# Patient Record
Sex: MALE | Race: BLACK OR AFRICAN AMERICAN | Employment: OTHER | URBAN - METROPOLITAN AREA
[De-identification: names, ages, dates, MRNs, and addresses within clinical notes are randomized per-mention and may not be internally consistent; named-entity substitution may affect disease eponyms.]

---

## 2019-07-11 ENCOUNTER — HOSPITAL ENCOUNTER (OUTPATIENT)
Dept: GENERAL RADIOLOGY | Age: 69
Discharge: HOME | End: 2019-07-11
Attending: FAMILY MEDICINE
Payer: MEDICARE

## 2019-07-11 DIAGNOSIS — M25.472 LEFT ANKLE SWELLING: ICD-10-CM

## 2019-07-11 PROCEDURE — 73610 X-RAY EXAM OF ANKLE: CPT

## 2019-07-11 NOTE — PROGRESS NOTES
Arthritis noted in ankle. Unusual for it to suddenly swell without injury.  I will order the ultrasound to make sure there is no clot in the leg

## 2019-07-17 ENCOUNTER — HOSPITAL ENCOUNTER (OUTPATIENT)
Dept: ULTRASOUND IMAGING | Age: 69
Discharge: HOME | End: 2019-07-17
Attending: FAMILY MEDICINE

## 2019-07-17 DIAGNOSIS — M25.472 LEFT ANKLE SWELLING: ICD-10-CM

## 2019-11-05 ENCOUNTER — HOSPITAL ENCOUNTER (EMERGENCY)
Age: 69
Discharge: HOME | End: 2019-11-05
Attending: EMERGENCY MEDICINE
Payer: MEDICARE

## 2019-11-05 VITALS
WEIGHT: 190 LBS | HEIGHT: 69 IN | SYSTOLIC BLOOD PRESSURE: 126 MMHG | OXYGEN SATURATION: 96 % | BODY MASS INDEX: 28.14 KG/M2 | HEART RATE: 57 BPM | RESPIRATION RATE: 18 BRPM | DIASTOLIC BLOOD PRESSURE: 72 MMHG | TEMPERATURE: 98.2 F

## 2019-11-05 DIAGNOSIS — T78.3XXA ANGIOEDEMA, INITIAL ENCOUNTER: Primary | ICD-10-CM

## 2019-11-05 LAB
ANION GAP SERPL CALC-SCNC: 8 MMOL/L (ref 7–16)
BASOPHILS # BLD: 0.1 K/UL (ref 0–0.2)
BASOPHILS NFR BLD: 1 % (ref 0–2)
BUN SERPL-MCNC: 17 MG/DL (ref 8–23)
CALCIUM SERPL-MCNC: 9.1 MG/DL (ref 8.3–10.4)
CHLORIDE SERPL-SCNC: 103 MMOL/L (ref 98–107)
CO2 SERPL-SCNC: 28 MMOL/L (ref 21–32)
CREAT SERPL-MCNC: 1.23 MG/DL (ref 0.8–1.5)
DIFFERENTIAL METHOD BLD: ABNORMAL
EOSINOPHIL # BLD: 0.3 K/UL (ref 0–0.8)
EOSINOPHIL NFR BLD: 4 % (ref 0.5–7.8)
ERYTHROCYTE [DISTWIDTH] IN BLOOD BY AUTOMATED COUNT: 12.9 % (ref 11.9–14.6)
GLUCOSE SERPL-MCNC: 87 MG/DL (ref 65–100)
HCT VFR BLD AUTO: 42.3 % (ref 41.1–50.3)
HGB BLD-MCNC: 13.4 G/DL (ref 13.6–17.2)
IMM GRANULOCYTES # BLD AUTO: 0 K/UL (ref 0–0.5)
IMM GRANULOCYTES NFR BLD AUTO: 0 % (ref 0–5)
LYMPHOCYTES # BLD: 2.7 K/UL (ref 0.5–4.6)
LYMPHOCYTES NFR BLD: 28 % (ref 13–44)
MCH RBC QN AUTO: 27.4 PG (ref 26.1–32.9)
MCHC RBC AUTO-ENTMCNC: 31.7 G/DL (ref 31.4–35)
MCV RBC AUTO: 86.5 FL (ref 79.6–97.8)
MONOCYTES # BLD: 0.7 K/UL (ref 0.1–1.3)
MONOCYTES NFR BLD: 8 % (ref 4–12)
NEUTS SEG # BLD: 5.8 K/UL (ref 1.7–8.2)
NEUTS SEG NFR BLD: 60 % (ref 43–78)
NRBC # BLD: 0 K/UL (ref 0–0.2)
PLATELET # BLD AUTO: 288 K/UL (ref 150–450)
PMV BLD AUTO: 9.4 FL (ref 9.4–12.3)
POTASSIUM SERPL-SCNC: 3.6 MMOL/L (ref 3.5–5.1)
RBC # BLD AUTO: 4.89 M/UL (ref 4.23–5.6)
SODIUM SERPL-SCNC: 139 MMOL/L (ref 136–145)
WBC # BLD AUTO: 9.7 K/UL (ref 4.3–11.1)

## 2019-11-05 PROCEDURE — 96375 TX/PRO/DX INJ NEW DRUG ADDON: CPT | Performed by: EMERGENCY MEDICINE

## 2019-11-05 PROCEDURE — 74011250636 HC RX REV CODE- 250/636: Performed by: EMERGENCY MEDICINE

## 2019-11-05 PROCEDURE — 96374 THER/PROPH/DIAG INJ IV PUSH: CPT | Performed by: EMERGENCY MEDICINE

## 2019-11-05 PROCEDURE — 85025 COMPLETE CBC W/AUTO DIFF WBC: CPT

## 2019-11-05 PROCEDURE — 74011636637 HC RX REV CODE- 636/637: Performed by: EMERGENCY MEDICINE

## 2019-11-05 PROCEDURE — 99284 EMERGENCY DEPT VISIT MOD MDM: CPT | Performed by: EMERGENCY MEDICINE

## 2019-11-05 PROCEDURE — 80048 BASIC METABOLIC PNL TOTAL CA: CPT

## 2019-11-05 PROCEDURE — 74011000250 HC RX REV CODE- 250: Performed by: EMERGENCY MEDICINE

## 2019-11-05 RX ORDER — DEXAMETHASONE SODIUM PHOSPHATE 100 MG/10ML
10 INJECTION INTRAMUSCULAR; INTRAVENOUS
Status: COMPLETED | OUTPATIENT
Start: 2019-11-05 | End: 2019-11-05

## 2019-11-05 RX ORDER — DIPHENHYDRAMINE HYDROCHLORIDE 50 MG/ML
25 INJECTION, SOLUTION INTRAMUSCULAR; INTRAVENOUS
Status: COMPLETED | OUTPATIENT
Start: 2019-11-05 | End: 2019-11-05

## 2019-11-05 RX ORDER — PREDNISONE 10 MG/1
40 TABLET ORAL
Status: COMPLETED | OUTPATIENT
Start: 2019-11-05 | End: 2019-11-05

## 2019-11-05 RX ORDER — PREDNISONE 20 MG/1
40 TABLET ORAL DAILY
Qty: 10 TAB | Refills: 0 | Status: SHIPPED | OUTPATIENT
Start: 2019-11-05 | End: 2019-11-10

## 2019-11-05 RX ORDER — FAMOTIDINE 20 MG/1
20 TABLET, FILM COATED ORAL 2 TIMES DAILY
Qty: 20 TAB | Refills: 0 | Status: SHIPPED | OUTPATIENT
Start: 2019-11-05 | End: 2019-11-15

## 2019-11-05 RX ADMIN — DEXAMETHASONE SODIUM PHOSPHATE 10 MG: 10 INJECTION INTRAMUSCULAR; INTRAVENOUS at 18:00

## 2019-11-05 RX ADMIN — PREDNISONE 40 MG: 10 TABLET ORAL at 19:51

## 2019-11-05 RX ADMIN — FAMOTIDINE 20 MG: 10 INJECTION, SOLUTION INTRAVENOUS at 18:00

## 2019-11-05 RX ADMIN — DIPHENHYDRAMINE HYDROCHLORIDE 25 MG: 50 INJECTION, SOLUTION INTRAMUSCULAR; INTRAVENOUS at 18:00

## 2019-11-05 NOTE — ED PROVIDER NOTES
26-year-old male has history of hypertension. Noted over the last 3 hours with swelling in his right tongue. No hoarseness or drooling. Has not tried any liquids. No chest pain or shortness of breath. No fever chills. No itching or rash. No history of similar. He is on an ARB. The history is provided by the patient. Tongue Swelling    The incident occurred just prior to arrival. Pertinent negatives include no abdominal pain, no vomiting and no cough. There were no sick contacts. Past Medical History:   Diagnosis Date    Cancer [facility])     prostate    Hypertension     Pancreatitis 2010    possible ETOH induced       Past Surgical History:   Procedure Laterality Date    HX COLONOSCOPY  2016    normal    HX CRYOABLATION OF THE PROSTATE      Still has a prostate    HX ORTHOPAEDIC      estiven knee replacements, cervical fusion C 6and 7         Family History:   Problem Relation Age of Onset    Hypertension Mother     No Known Problems Father         did not know him       Social History     Socioeconomic History    Marital status:      Spouse name: Kenyetta Patrick Number of children: Not on file    Years of education: Not on file    Highest education level: Not on file   Occupational History    Occupation: Retired     Comment: [employer]   Social Needs    Financial resource strain: Not on file    Food insecurity:     Worry: Not on file     Inability: Not on file   [facility] needs:     Medical: Not on file     Non-medical: Not on file   Tobacco Use    Smoking status: Never Smoker    Smokeless tobacco: Never Used   Substance and Sexual Activity    Alcohol use:  Yes     Alcohol/week: 1.0 standard drinks     Types: 1 Cans of beer per week    Drug use: Not on file    Sexual activity: Not on file   Lifestyle    Physical activity:     Days per week: Not on file     Minutes per session: Not on file    Stress: Not on file   Relationships    Social connections:     Talks on phone: Not on file     Gets together: Not on file     Attends [employer] service: Not on file     Active member of club or organization: Not on file     Attends meetings of clubs or organizations: Not on file     Relationship status: Not on file    Intimate partner violence:     Fear of current or ex partner: Not on file     Emotionally abused: Not on file     Physically abused: Not on file     Forced sexual activity: Not on file   Other Topics Concern    Not on file   Social History Narrative    Not on file         ALLERGIES: Morphine    Review of Systems   Constitutional: Negative for chills and fever. HENT: Negative for drooling and voice change. Respiratory: Negative for cough, shortness of breath and stridor. Gastrointestinal: Negative for abdominal pain and vomiting. Vitals:    11/05/19 1722   BP: 155/62   Pulse: 62   Resp: 16   Temp: 98.2 °F (36.8 °C)   SpO2: 98%   Weight: 86.2 kg (190 lb)   Height: 5' 9\" (1.753 m)            Physical Exam   Constitutional: He appears well-developed and well-nourished. No distress. HENT:   Mouth/Throat: No oropharyngeal exudate. Eyes: Pupils are equal, round, and reactive to light. Conjunctivae are normal.   Neck: Normal range of motion. Cardiovascular: Normal rate and regular rhythm. Pulmonary/Chest: Effort normal and breath sounds normal.   Nursing note and vitals reviewed. MDM  Number of Diagnoses or Management Options  Diagnosis management comments: Angioedema, possibly allergic versus due to ARB. IV steroids Benadryl and observation. Amount and/or Complexity of Data Reviewed  Clinical lab tests: ordered and reviewed    Risk of Complications, Morbidity, and/or Mortality  Presenting problems: moderate  Diagnostic procedures: minimal  Management options: low           Procedures    6:27 PM  Labs normal.  Patient states slight improvement. Feels he can talk better. Still no shortness of breath.     Results Include:    Recent Results (from the past 24 hour(s))   CBC WITH AUTOMATED DIFF    Collection Time: 11/05/19  5:57 PM   Result Value Ref Range    WBC 9.7 4.3 - 11.1 K/uL    RBC 4.89 4.23 - 5.6 M/uL    HGB 13.4 (L) 13.6 - 17.2 g/dL    HCT 42.3 41.1 - 50.3 %    MCV 86.5 79.6 - 97.8 FL    MCH 27.4 26.1 - 32.9 PG    MCHC 31.7 31.4 - 35.0 g/dL    RDW 12.9 11.9 - 14.6 %    PLATELET 963 278 - 457 K/uL    MPV 9.4 9.4 - 12.3 FL    ABSOLUTE NRBC 0.00 0.0 - 0.2 K/uL    DF AUTOMATED      NEUTROPHILS 60 43 - 78 %    LYMPHOCYTES 28 13 - 44 %    MONOCYTES 8 4.0 - 12.0 %    EOSINOPHILS 4 0.5 - 7.8 %    BASOPHILS 1 0.0 - 2.0 %    IMMATURE GRANULOCYTES 0 0.0 - 5.0 %    ABS. NEUTROPHILS 5.8 1.7 - 8.2 K/UL    ABS. LYMPHOCYTES 2.7 0.5 - 4.6 K/UL    ABS. MONOCYTES 0.7 0.1 - 1.3 K/UL    ABS. EOSINOPHILS 0.3 0.0 - 0.8 K/UL    ABS. BASOPHILS 0.1 0.0 - 0.2 K/UL    ABS. IMM. GRANS. 0.0 0.0 - 0.5 K/UL   METABOLIC PANEL, BASIC    Collection Time: 11/05/19  5:57 PM   Result Value Ref Range    Sodium 139 136 - 145 mmol/L    Potassium 3.6 3.5 - 5.1 mmol/L    Chloride 103 98 - 107 mmol/L    CO2 28 21 - 32 mmol/L    Anion gap 8 7 - 16 mmol/L    Glucose 87 65 - 100 mg/dL    BUN 17 8 - 23 MG/DL    Creatinine 1.23 0.8 - 1.5 MG/DL    GFR est AA >60 >60 ml/min/1.73m2    GFR est non-AA >60 >60 ml/min/1.73m2    Calcium 9.1 8.3 - 10.4 MG/DL       .7:31 PM  Swelling going down even further. Stable for discharge home.

## 2019-11-05 NOTE — DISCHARGE INSTRUCTIONS
Benadryl, 1-2 every 6 hours for next 2 days. Do not take the blood pressure medication indicated. Take prednisone every morning for the next 5 days. Call your doctor tomorrow to recheck and also to discuss whether or not to continue the blood pressure medication. Recheck sooner for worse breathing, high fever shortness of breath. Patient Education        Angioedema: Care Instructions  Your Care Instructions    Angioedema is an allergic reaction. It causes swelling and welts in the deep layers of the skin. Angioedema can sometimes occur along with hives. Hives are an allergic reaction in the outer layers of the skin. Angioedema can range from mild to severe. Painful welts can develop on the face. Angioedema can also occur on other parts of the body. In severe cases, the inside of the throat can swell and make it hard to breathe. Many things can cause this condition, including foods, insect bites, and medicines (such as aspirin and some blood pressure medicines). It also can run in families. Sometimes you may know what caused the reaction, but other times you may not know. Follow-up care is a key part of your treatment and safety. Be sure to make and go to all appointments, and call your doctor if you are having problems. It's also a good idea to know your test results and keep a list of the medicines you take. How can you care for yourself at home? · Take your medicines exactly as prescribed. Call your doctor if you think you are having a problem with your medicine. You will get more details on the specific medicines your doctor prescribes. Some medicines used to treat angioedema can make you too sleepy to drive safely. Do not drive if you take medicine that may make you sleepy. · Avoid foods or medicine that may have triggered the swelling. · For comfort:  ? Try taking a cool bath. Or place a cool, wet towel on the swollen area. ? Avoid hot baths and showers. ? Wear loose clothing.   · Your doctor may prescribe a shot of epinephrine to carry with you in case you have a severe reaction. Learn how to give yourself the shot and keep it with you at all times. Make sure it has not . When should you call for help? Give an epinephrine shot if:    · You think you are having a severe allergic reaction.    After giving an epinephrine shot call 911, even if you feel better.   Call 911 if:    · You have symptoms of a severe allergic reaction. These may include:  ? Sudden raised, red areas (hives) all over your body. ? Swelling of the throat, mouth, lips, or tongue. ? Trouble breathing. ? Passing out (losing consciousness). Or you may feel very lightheaded or suddenly feel weak, confused, or restless.     · You have been given an epinephrine shot, even if you feel better.    Call your doctor now or seek immediate medical care if:    · You have symptoms of an allergic reaction, such as:  ? A rash or hives (raised, red areas on the skin). ? Itching. ? Swelling. ? Belly pain, nausea, or vomiting.    Watch closely for changes in your health, and be sure to contact your doctor if:    · You do not get better as expected. Where can you learn more? Go to http://claire-jasmin.info/. Enter W165 in the search box to learn more about \"Angioedema: Care Instructions. \"  Current as of: 2019  Content Version: 12.2  © 5619-9568 [employer]. Care instructions adapted under license by [employer] (which disclaims liability or warranty for this information). If you have questions about a medical condition or this instruction, always ask your healthcare professional. [address] any warranty or liability for your use of this information.

## 2019-11-05 NOTE — ED TRIAGE NOTES
Pt states swelling to right side of tongue for about one hour. States it \"feels funny to swallow\". Denies SOB or sore throat. States he has not taken any new medications and has not eaten any new foods.

## 2019-11-06 NOTE — ED NOTES
I have reviewed discharge instructions with the patient. The patient verbalized understanding. Patient left ED via Discharge Method: ambulatory to Home with spouse. Opportunity for questions and clarification provided. Patient given 2 scripts.

## 2019-11-12 ENCOUNTER — APPOINTMENT (OUTPATIENT)
Dept: GENERAL RADIOLOGY | Age: 69
End: 2019-11-12
Attending: EMERGENCY MEDICINE
Payer: MEDICARE

## 2019-11-12 ENCOUNTER — HOSPITAL ENCOUNTER (EMERGENCY)
Age: 69
Discharge: HOME | End: 2019-11-12
Attending: EMERGENCY MEDICINE
Payer: MEDICARE

## 2019-11-12 VITALS
WEIGHT: 190 LBS | SYSTOLIC BLOOD PRESSURE: 161 MMHG | RESPIRATION RATE: 16 BRPM | BODY MASS INDEX: 28.14 KG/M2 | HEART RATE: 56 BPM | OXYGEN SATURATION: 96 % | DIASTOLIC BLOOD PRESSURE: 89 MMHG | HEIGHT: 69 IN

## 2019-11-12 DIAGNOSIS — K29.90 GASTRITIS AND DUODENITIS: Primary | ICD-10-CM

## 2019-11-12 LAB
ALBUMIN SERPL-MCNC: 3.6 G/DL (ref 3.2–4.6)
ALBUMIN/GLOB SERPL: 1 {RATIO} (ref 1.2–3.5)
ALP SERPL-CCNC: 64 U/L (ref 50–136)
ALT SERPL-CCNC: 41 U/L (ref 12–65)
ANION GAP SERPL CALC-SCNC: 8 MMOL/L (ref 7–16)
AST SERPL-CCNC: 25 U/L (ref 15–37)
BASOPHILS # BLD: 0 K/UL (ref 0–0.2)
BASOPHILS NFR BLD: 0 % (ref 0–2)
BILIRUB SERPL-MCNC: 0.5 MG/DL (ref 0.2–1.1)
BUN SERPL-MCNC: 22 MG/DL (ref 8–23)
CALCIUM SERPL-MCNC: 8.7 MG/DL (ref 8.3–10.4)
CHLORIDE SERPL-SCNC: 96 MMOL/L (ref 98–107)
CO2 SERPL-SCNC: 35 MMOL/L (ref 21–32)
CREAT SERPL-MCNC: 1.45 MG/DL (ref 0.8–1.5)
DIFFERENTIAL METHOD BLD: ABNORMAL
EOSINOPHIL # BLD: 0.1 K/UL (ref 0–0.8)
EOSINOPHIL NFR BLD: 1 % (ref 0.5–7.8)
ERYTHROCYTE [DISTWIDTH] IN BLOOD BY AUTOMATED COUNT: 12.8 % (ref 11.9–14.6)
GLOBULIN SER CALC-MCNC: 3.5 G/DL (ref 2.3–3.5)
GLUCOSE SERPL-MCNC: 96 MG/DL (ref 65–100)
HCT VFR BLD AUTO: 46.5 % (ref 41.1–50.3)
HGB BLD-MCNC: 14.9 G/DL (ref 13.6–17.2)
IMM GRANULOCYTES # BLD AUTO: 0.1 K/UL (ref 0–0.5)
IMM GRANULOCYTES NFR BLD AUTO: 1 % (ref 0–5)
LIPASE SERPL-CCNC: 306 U/L (ref 73–393)
LYMPHOCYTES # BLD: 3.1 K/UL (ref 0.5–4.6)
LYMPHOCYTES NFR BLD: 24 % (ref 13–44)
MCH RBC QN AUTO: 27.6 PG (ref 26.1–32.9)
MCHC RBC AUTO-ENTMCNC: 32 G/DL (ref 31.4–35)
MCV RBC AUTO: 86.1 FL (ref 79.6–97.8)
MONOCYTES # BLD: 0.9 K/UL (ref 0.1–1.3)
MONOCYTES NFR BLD: 7 % (ref 4–12)
NEUTS SEG # BLD: 8.8 K/UL (ref 1.7–8.2)
NEUTS SEG NFR BLD: 68 % (ref 43–78)
NRBC # BLD: 0 K/UL (ref 0–0.2)
PLATELET # BLD AUTO: 262 K/UL (ref 150–450)
PMV BLD AUTO: 9.4 FL (ref 9.4–12.3)
POTASSIUM SERPL-SCNC: 3.4 MMOL/L (ref 3.5–5.1)
PROT SERPL-MCNC: 7.1 G/DL (ref 6.3–8.2)
RBC # BLD AUTO: 5.4 M/UL (ref 4.23–5.6)
SODIUM SERPL-SCNC: 139 MMOL/L (ref 136–145)
TROPONIN I BLD-MCNC: 0 NG/ML (ref 0.02–0.05)
WBC # BLD AUTO: 13.1 K/UL (ref 4.3–11.1)

## 2019-11-12 PROCEDURE — 99284 EMERGENCY DEPT VISIT MOD MDM: CPT | Performed by: EMERGENCY MEDICINE

## 2019-11-12 PROCEDURE — 93005 ELECTROCARDIOGRAM TRACING: CPT | Performed by: EMERGENCY MEDICINE

## 2019-11-12 PROCEDURE — 85025 COMPLETE CBC W/AUTO DIFF WBC: CPT

## 2019-11-12 PROCEDURE — 83690 ASSAY OF LIPASE: CPT

## 2019-11-12 PROCEDURE — 84484 ASSAY OF TROPONIN QUANT: CPT

## 2019-11-12 PROCEDURE — 80053 COMPREHEN METABOLIC PANEL: CPT

## 2019-11-12 PROCEDURE — 71045 X-RAY EXAM CHEST 1 VIEW: CPT

## 2019-11-12 PROCEDURE — 74011000250 HC RX REV CODE- 250: Performed by: EMERGENCY MEDICINE

## 2019-11-12 PROCEDURE — 74011250637 HC RX REV CODE- 250/637: Performed by: EMERGENCY MEDICINE

## 2019-11-12 PROCEDURE — 96374 THER/PROPH/DIAG INJ IV PUSH: CPT | Performed by: EMERGENCY MEDICINE

## 2019-11-12 PROCEDURE — 74011250636 HC RX REV CODE- 250/636: Performed by: EMERGENCY MEDICINE

## 2019-11-12 RX ORDER — PANTOPRAZOLE SODIUM 20 MG/1
20 TABLET, DELAYED RELEASE ORAL DAILY
Qty: 30 TAB | Refills: 0 | Status: SHIPPED | OUTPATIENT
Start: 2019-11-12 | End: 2020-09-21 | Stop reason: CLARIF

## 2019-11-12 RX ORDER — SUCRALFATE 1 G/1
1 TABLET ORAL 4 TIMES DAILY
Qty: 90 TAB | Refills: 0 | Status: SHIPPED | OUTPATIENT
Start: 2019-11-12 | End: 2020-09-21 | Stop reason: CLARIF

## 2019-11-12 RX ORDER — SUCRALFATE 1 G/1
1 TABLET ORAL ONCE
Status: COMPLETED | OUTPATIENT
Start: 2019-11-12 | End: 2019-11-12

## 2019-11-12 RX ADMIN — FAMOTIDINE 40 MG: 10 INJECTION, SOLUTION INTRAVENOUS at 19:51

## 2019-11-12 RX ADMIN — SUCRALFATE 1 G: 1 TABLET ORAL at 19:51

## 2019-11-13 LAB
ATRIAL RATE: 55 BPM
CALCULATED P AXIS, ECG09: 78 DEGREES
CALCULATED R AXIS, ECG10: 4 DEGREES
CALCULATED T AXIS, ECG11: 18 DEGREES
DIAGNOSIS, 93000: NORMAL
P-R INTERVAL, ECG05: 186 MS
Q-T INTERVAL, ECG07: 428 MS
QRS DURATION, ECG06: 98 MS
QTC CALCULATION (BEZET), ECG08: 409 MS
VENTRICULAR RATE, ECG03: 55 BPM

## 2019-11-13 NOTE — ED NOTES
I have reviewed discharge instructions with the patient. The patient verbalized understanding. Patient left ED via Discharge Method: ambulatory to Home with spouse. Opportunity for questions and clarification provided. Patient given 2 scripts. To continue your aftercare when you leave the hospital, you may receive an automated call from our care team to check in on how you are doing. This is a free service and part of our promise to provide the best care and service to meet your aftercare needs.  If you have questions, or wish to unsubscribe from this service please call [phone]. Thank you for Choosing our [facility].

## 2019-11-13 NOTE — DISCHARGE INSTRUCTIONS
Your symptoms are almost certainly caused by recent prednisone treatment. He is to prescribe medications for the next month  You need not continue after the next month unless you have resumption of symptoms thereafter. Follow-up with your primary care physician for reevaluation and discussion of the symptoms.

## 2020-09-22 ENCOUNTER — ANESTHESIA EVENT (OUTPATIENT)
Dept: SURGERY | Age: 70
End: 2020-09-22
Payer: MEDICARE

## 2020-09-22 NOTE — H&P
Summary: Left posttraumatic Ankle Arthritis. We will try [facility] total ankle with the iN bone talus. He will need to get the CT scan and revision, and we will schedule his total ankle when he is ready. He has some concerns about his wife being able to stay with him due to coronavirus. He states that he does not want to spend the night in the hospital because of coronavirus. CC: Left Ankle pain    HPI: Patient presents today with a constant dull, throb and occasional sharp pain in their ankle. They deny popping and locking of the ankle. The describe the pain as a 810. They State had a remote injury several years ago to this ankle. They have difficulty walking, jogging, performing activities that move the ankle. At the end of the day it is worse, or while getting up after sitting for a while. The pain was mild at first but has gotten worse over he past few month. Attempting to move the ankle, walk, bear weight, and palpation causes pain. Resting with elevation improves the pain, but does not get rid of all it. They have tried bracing/orthotics and have had injections. Specifically the injections helped him but he does not want to have chronic injections, he does not wish to continue taking chronic NSAIDs, he does not wish to wear a brace every day. He can no longer cut his grass or perform everyday activities. He has a very difficult time walking to his mailbox and performing the most minimal physical activities. He wishes to be as normal as possible. He states he is ready for surgery but does have concerns around staying in the hospital secondary to coronavirus. ROS:  Standardized patient intake form was reviewed and signed by me. It covers 10 organ systems. [address]:  Past Medical, Family and Social history was obtained by standardized patient intake form.   It was reviewed and signed by me  Allergies:Morphine Sulf Microinfusion PF  Medications:amLODIPine Bes+SyrSpend SF;Atenolol    Tobacco: Non-Smoker   Diabetes:none   Recent A1C: [ ]    PE:  CV: RRR  RESP: CTAB    Well developed and well nourished  patient in No acute distress, aaox3, appropriate mood and affect. Patient ambulates with mildly antalgic gait with good coordination and balance Pulse:[ ]     BUE: show   normal full motion of bilateral wrist, no thenar atrophy, normal  strength, hands are non TTP, with no skin lesions. No pathologic reflexes noted: negative Bianchi, negative inverted radial reflex. Negative Wartenberg sign. BLE: show normal monofilament sensation, no palpable lymphadenopathy, patellar tendon reflexes 3+. Right LE -- this is the unaffected side: FROM actively of toes, foot, ankle, knee and hip. No instability of foot or ankle with drawer and stress. 5/5 strength to TA/EHL/GSC/Peroneals/PTib. No skin lesions, Non TTP throughout. There is no edema or ecchymosis. Left LE -- this is the affected side: There is some edema at the ankle. They are TTP at the anterior ankle along with the medial and lateral ankle. .  There are no open wounds or sores. Full active ROM of toes but pain with any plantar or dorsiflexion of the ankle. There is crepitus with ankle ROM. Stability exam to anterior drawer and varus/valgus reveals, and there is no malalignment. I see no signs of varus valgus deformities. .   [id number] w/ 5/5 strength. Skin shows no lacerations or wounds. XR: 3 view ankle reviewed by me and shows no fractures or dislocations. No signs of tumors or mass. There is tricompartmental arthritis at the tibiotalar joint with a neutral alignment. This is a concentric deformity     AssessmentDiagnosis: Left Ankle Arthritis    Plan:  1- Weight Bearing  WBAT  2- XR needed at Next appt: - none  3- Immobilization  We recommend  The brace he has not picked up: [address] [place] [address]  4- Medication - non supplied today.   I personally had a discussion about abuse potential of narcotics, how narcotics are only given for acute injuries or operative pain, and how no refills can nor will be given until the patients next appointment. They expressed understanding. 5- Work restrictions- (none,)  6-  We discussed Ankle Injections today no ankle injection was given today. 7-  We had a long discussion about the pros and cons of operative vs. non operative treatment. [ ]We decided on non operative treatment with bracing at this time. If pain is not relieved we can discuss injections and eventually operative intervention of Total Ankle or Arthrodesis. He has decided on a total ankle arthroplasty. He will need a CT scan prophecy done at [address] [place] [address], and we will use the [facility] prophecy total ankle system, with the in bone talus. He has significant concerns about he and his wife being in the hospital due to  coronavirus. I explained that this procedure is an outpatient procedure and my mind however some insurances to request he stay overnight. We are going to proceed with surgery but attempted discharge him from the recovery room because of his intense fear of the life-threatening coronavirus. Operative Discussion: We discussed non operative and operative treatment options. With surgery we discussed infection, nerve damage, vascular insult, painful wounds, chronic edema, malunion, non union, hardware irritation, second surgery, and potential recurrent deformity. We also discussed how anesthesia has inherent risk that can result in respiratory issues, heart issues, nerve irritation and potentially death.

## 2020-09-23 ENCOUNTER — HOSPITAL ENCOUNTER (OUTPATIENT)
Age: 70
Discharge: HOME | End: 2020-09-23
Attending: ORTHOPAEDIC SURGERY | Admitting: ORTHOPAEDIC SURGERY
Payer: MEDICARE

## 2020-09-23 ENCOUNTER — APPOINTMENT (OUTPATIENT)
Dept: GENERAL RADIOLOGY | Age: 70
End: 2020-09-23
Attending: ORTHOPAEDIC SURGERY
Payer: MEDICARE

## 2020-09-23 ENCOUNTER — ANESTHESIA (OUTPATIENT)
Dept: SURGERY | Age: 70
End: 2020-09-23
Payer: MEDICARE

## 2020-09-23 VITALS
SYSTOLIC BLOOD PRESSURE: 132 MMHG | TEMPERATURE: 97.1 F | DIASTOLIC BLOOD PRESSURE: 70 MMHG | BODY MASS INDEX: 27.12 KG/M2 | WEIGHT: 189 LBS | RESPIRATION RATE: 16 BRPM | HEART RATE: 65 BPM | OXYGEN SATURATION: 97 %

## 2020-09-23 LAB
COVID-19 RAPID TEST, COVR: NOT DETECTED
SOURCE, COVRS: NORMAL

## 2020-09-23 PROCEDURE — 74011000250 HC RX REV CODE- 250: Performed by: ORTHOPAEDIC SURGERY

## 2020-09-23 PROCEDURE — 77030040982 HC SCR BN REMVR WRGH -C: Performed by: ORTHOPAEDIC SURGERY

## 2020-09-23 PROCEDURE — 77030013921: Performed by: ORTHOPAEDIC SURGERY

## 2020-09-23 PROCEDURE — 74011250636 HC RX REV CODE- 250/636: Performed by: NURSE ANESTHETIST, CERTIFIED REGISTERED

## 2020-09-23 PROCEDURE — 77030020275 HC MISC ORTHOPEDIC: Performed by: ORTHOPAEDIC SURGERY

## 2020-09-23 PROCEDURE — 77030006788 HC BLD SAW OSC STRY -B: Performed by: ORTHOPAEDIC SURGERY

## 2020-09-23 PROCEDURE — 73610 X-RAY EXAM OF ANKLE: CPT

## 2020-09-23 PROCEDURE — 77030010507 HC ADH SKN DERMBND J&J -B: Performed by: ORTHOPAEDIC SURGERY

## 2020-09-23 PROCEDURE — 77030021650 HC BLD SAW WRGH -C: Performed by: ORTHOPAEDIC SURGERY

## 2020-09-23 PROCEDURE — 77030003602 HC NDL NRV BLK BBMI -B: Performed by: STUDENT IN AN ORGANIZED HEALTH CARE EDUCATION/TRAINING PROGRAM

## 2020-09-23 PROCEDURE — 77030018673: Performed by: ORTHOPAEDIC SURGERY

## 2020-09-23 PROCEDURE — 2709999900 HC NON-CHARGEABLE SUPPLY: Performed by: ORTHOPAEDIC SURGERY

## 2020-09-23 PROCEDURE — C1713 ANCHOR/SCREW BN/BN,TIS/BN: HCPCS | Performed by: ORTHOPAEDIC SURGERY

## 2020-09-23 PROCEDURE — 76060000036 HC ANESTHESIA 2.5 TO 3 HR: Performed by: ORTHOPAEDIC SURGERY

## 2020-09-23 PROCEDURE — 74011000250 HC RX REV CODE- 250: Performed by: NURSE ANESTHETIST, CERTIFIED REGISTERED

## 2020-09-23 PROCEDURE — 77030020274 HC MISC IMPL ORTHOPEDIC: Performed by: ORTHOPAEDIC SURGERY

## 2020-09-23 PROCEDURE — 74011250636 HC RX REV CODE- 250/636: Performed by: ORTHOPAEDIC SURGERY

## 2020-09-23 PROCEDURE — 76010010054 HC POST OP PAIN BLOCK: Performed by: ORTHOPAEDIC SURGERY

## 2020-09-23 PROCEDURE — 77030002933 HC SUT MCRYL J&J -A: Performed by: ORTHOPAEDIC SURGERY

## 2020-09-23 PROCEDURE — 76010010054 HC POST OP PAIN BLOCK

## 2020-09-23 PROCEDURE — 76010000172 HC OR TIME 2.5 TO 3 HR INTENSV-TIER 1: Performed by: ORTHOPAEDIC SURGERY

## 2020-09-23 PROCEDURE — 76210000063 HC OR PH I REC FIRST 0.5 HR: Performed by: ORTHOPAEDIC SURGERY

## 2020-09-23 PROCEDURE — 76942 ECHO GUIDE FOR BIOPSY: CPT | Performed by: ORTHOPAEDIC SURGERY

## 2020-09-23 PROCEDURE — 74011250636 HC RX REV CODE- 250/636: Performed by: STUDENT IN AN ORGANIZED HEALTH CARE EDUCATION/TRAINING PROGRAM

## 2020-09-23 PROCEDURE — 77030000032 HC CUF TRNQT ZIMM -B: Performed by: ORTHOPAEDIC SURGERY

## 2020-09-23 PROCEDURE — 87635 SARS-COV-2 COVID-19 AMP PRB: CPT

## 2020-09-23 PROCEDURE — 76210000021 HC REC RM PH II 0.5 TO 1 HR: Performed by: ORTHOPAEDIC SURGERY

## 2020-09-23 PROCEDURE — 77030039605 HC GD NAV ALIGN PROPHECY DISP WRGH -G1: Performed by: ORTHOPAEDIC SURGERY

## 2020-09-23 PROCEDURE — 77030037713 HC CLOSR DEV INCIS ZIP STRY -B: Performed by: ORTHOPAEDIC SURGERY

## 2020-09-23 PROCEDURE — 74011250637 HC RX REV CODE- 250/637: Performed by: ORTHOPAEDIC SURGERY

## 2020-09-23 PROCEDURE — 77030040922 HC BLNKT HYPOTHRM STRY -A: Performed by: STUDENT IN AN ORGANIZED HEALTH CARE EDUCATION/TRAINING PROGRAM

## 2020-09-23 PROCEDURE — 77030010509 HC AIRWY LMA MSK TELE -A: Performed by: STUDENT IN AN ORGANIZED HEALTH CARE EDUCATION/TRAINING PROGRAM

## 2020-09-23 DEVICE — IMPLANTABLE DEVICE
Type: IMPLANTABLE DEVICE | Site: ANKLE | Status: FUNCTIONAL
Brand: INFINITY

## 2020-09-23 DEVICE — STEINMANN PIN
Type: IMPLANTABLE DEVICE | Site: ANKLE | Status: FUNCTIONAL
Brand: INBONE

## 2020-09-23 DEVICE — TEMP FIX PIN TALAR GUIDE
Type: IMPLANTABLE DEVICE | Site: ANKLE | Status: FUNCTIONAL
Brand: INFINITY

## 2020-09-23 RX ORDER — SODIUM CHLORIDE 0.9 % (FLUSH) 0.9 %
5-40 SYRINGE (ML) INJECTION AS NEEDED
Status: CANCELLED | OUTPATIENT
Start: 2020-09-23

## 2020-09-23 RX ORDER — ONDANSETRON 2 MG/ML
INJECTION INTRAMUSCULAR; INTRAVENOUS AS NEEDED
Status: DISCONTINUED | OUTPATIENT
Start: 2020-09-23 | End: 2020-09-23 | Stop reason: HOSPADM

## 2020-09-23 RX ORDER — SODIUM CHLORIDE 0.9 % (FLUSH) 0.9 %
5-40 SYRINGE (ML) INJECTION EVERY 8 HOURS
Status: DISCONTINUED | OUTPATIENT
Start: 2020-09-23 | End: 2020-09-23 | Stop reason: HOSPADM

## 2020-09-23 RX ORDER — ROPIVACAINE HYDROCHLORIDE 5 MG/ML
INJECTION, SOLUTION EPIDURAL; INFILTRATION; PERINEURAL
Status: DISCONTINUED | OUTPATIENT
Start: 2020-09-23 | End: 2020-09-23 | Stop reason: HOSPADM

## 2020-09-23 RX ORDER — MINERAL OIL
OIL (ML) MISCELLANEOUS AS NEEDED
Status: DISCONTINUED | OUTPATIENT
Start: 2020-09-23 | End: 2020-09-23 | Stop reason: HOSPADM

## 2020-09-23 RX ORDER — SODIUM CHLORIDE 0.9 % (FLUSH) 0.9 %
5-40 SYRINGE (ML) INJECTION AS NEEDED
Status: DISCONTINUED | OUTPATIENT
Start: 2020-09-23 | End: 2020-09-23 | Stop reason: HOSPADM

## 2020-09-23 RX ORDER — FLUMAZENIL 0.1 MG/ML
0.2 INJECTION INTRAVENOUS
Status: DISCONTINUED | OUTPATIENT
Start: 2020-09-23 | End: 2020-09-23 | Stop reason: HOSPADM

## 2020-09-23 RX ORDER — LIDOCAINE HYDROCHLORIDE 20 MG/ML
INJECTION, SOLUTION EPIDURAL; INFILTRATION; INTRACAUDAL; PERINEURAL AS NEEDED
Status: DISCONTINUED | OUTPATIENT
Start: 2020-09-23 | End: 2020-09-23 | Stop reason: HOSPADM

## 2020-09-23 RX ORDER — DEXAMETHASONE SODIUM PHOSPHATE 4 MG/ML
INJECTION, SOLUTION INTRA-ARTICULAR; INTRALESIONAL; INTRAMUSCULAR; INTRAVENOUS; SOFT TISSUE
Status: DISCONTINUED | OUTPATIENT
Start: 2020-09-23 | End: 2020-09-23 | Stop reason: HOSPADM

## 2020-09-23 RX ORDER — PROPOFOL 10 MG/ML
INJECTION, EMULSION INTRAVENOUS AS NEEDED
Status: DISCONTINUED | OUTPATIENT
Start: 2020-09-23 | End: 2020-09-23 | Stop reason: HOSPADM

## 2020-09-23 RX ORDER — FENTANYL CITRATE 50 UG/ML
100 INJECTION, SOLUTION INTRAMUSCULAR; INTRAVENOUS ONCE
Status: DISCONTINUED | OUTPATIENT
Start: 2020-09-23 | End: 2020-09-23 | Stop reason: HOSPADM

## 2020-09-23 RX ORDER — NALOXONE HYDROCHLORIDE 0.4 MG/ML
0.1 INJECTION, SOLUTION INTRAMUSCULAR; INTRAVENOUS; SUBCUTANEOUS AS NEEDED
Status: DISCONTINUED | OUTPATIENT
Start: 2020-09-23 | End: 2020-09-23 | Stop reason: HOSPADM

## 2020-09-23 RX ORDER — NALOXONE HYDROCHLORIDE 0.4 MG/ML
0.1 INJECTION, SOLUTION INTRAMUSCULAR; INTRAVENOUS; SUBCUTANEOUS
Status: DISCONTINUED | OUTPATIENT
Start: 2020-09-23 | End: 2020-09-23 | Stop reason: HOSPADM

## 2020-09-23 RX ORDER — CEFAZOLIN SODIUM/WATER 2 G/20 ML
2 SYRINGE (ML) INTRAVENOUS ONCE
Status: COMPLETED | OUTPATIENT
Start: 2020-09-23 | End: 2020-09-23

## 2020-09-23 RX ORDER — SODIUM CHLORIDE, SODIUM LACTATE, POTASSIUM CHLORIDE, CALCIUM CHLORIDE 600; 310; 30; 20 MG/100ML; MG/100ML; MG/100ML; MG/100ML
100 INJECTION, SOLUTION INTRAVENOUS CONTINUOUS
Status: DISCONTINUED | OUTPATIENT
Start: 2020-09-23 | End: 2020-09-23 | Stop reason: HOSPADM

## 2020-09-23 RX ORDER — LIDOCAINE HYDROCHLORIDE 10 MG/ML
0.1 INJECTION INFILTRATION; PERINEURAL AS NEEDED
Status: DISCONTINUED | OUTPATIENT
Start: 2020-09-23 | End: 2020-09-23 | Stop reason: HOSPADM

## 2020-09-23 RX ORDER — DIPHENHYDRAMINE HYDROCHLORIDE 50 MG/ML
12.5 INJECTION, SOLUTION INTRAMUSCULAR; INTRAVENOUS
Status: DISCONTINUED | OUTPATIENT
Start: 2020-09-23 | End: 2020-09-23 | Stop reason: HOSPADM

## 2020-09-23 RX ORDER — EPHEDRINE SULFATE/0.9% NACL/PF 50 MG/5 ML
SYRINGE (ML) INTRAVENOUS AS NEEDED
Status: DISCONTINUED | OUTPATIENT
Start: 2020-09-23 | End: 2020-09-23 | Stop reason: HOSPADM

## 2020-09-23 RX ORDER — SODIUM CHLORIDE 0.9 % (FLUSH) 0.9 %
5-40 SYRINGE (ML) INJECTION EVERY 8 HOURS
Status: CANCELLED | OUTPATIENT
Start: 2020-09-23

## 2020-09-23 RX ORDER — MIDAZOLAM HYDROCHLORIDE 1 MG/ML
2 INJECTION, SOLUTION INTRAMUSCULAR; INTRAVENOUS ONCE
Status: COMPLETED | OUTPATIENT
Start: 2020-09-23 | End: 2020-09-23

## 2020-09-23 RX ADMIN — ROPIVACAINE HYDROCHLORIDE 20 ML: 150 INJECTION, SOLUTION EPIDURAL; INFILTRATION; PERINEURAL at 07:12

## 2020-09-23 RX ADMIN — Medication 10 MG: at 08:23

## 2020-09-23 RX ADMIN — ROPIVACAINE HYDROCHLORIDE 10 ML: 150 INJECTION, SOLUTION EPIDURAL; INFILTRATION; PERINEURAL at 07:10

## 2020-09-23 RX ADMIN — ONDANSETRON 4 MG: 2 INJECTION INTRAMUSCULAR; INTRAVENOUS at 08:23

## 2020-09-23 RX ADMIN — SODIUM CHLORIDE, SODIUM LACTATE, POTASSIUM CHLORIDE, AND CALCIUM CHLORIDE 100 ML/HR: 600; 310; 30; 20 INJECTION, SOLUTION INTRAVENOUS at 06:45

## 2020-09-23 RX ADMIN — Medication 2 G: at 07:18

## 2020-09-23 RX ADMIN — MIDAZOLAM 2 MG: 1 INJECTION INTRAMUSCULAR; INTRAVENOUS at 07:07

## 2020-09-23 RX ADMIN — SODIUM CHLORIDE, SODIUM LACTATE, POTASSIUM CHLORIDE, AND CALCIUM CHLORIDE: 600; 310; 30; 20 INJECTION, SOLUTION INTRAVENOUS at 08:44

## 2020-09-23 RX ADMIN — Medication 10 MG: at 09:49

## 2020-09-23 RX ADMIN — PROPOFOL 10 MG: 10 INJECTION, EMULSION INTRAVENOUS at 07:24

## 2020-09-23 RX ADMIN — PHENYLEPHRINE HYDROCHLORIDE 100 MCG: 10 INJECTION INTRAVENOUS at 09:50

## 2020-09-23 RX ADMIN — Medication 10 MG: at 08:08

## 2020-09-23 RX ADMIN — Medication 10 MG: at 07:57

## 2020-09-23 RX ADMIN — DEXAMETHASONE SODIUM PHOSPHATE 3 MG: 4 INJECTION, SOLUTION INTRAMUSCULAR; INTRAVENOUS at 07:12

## 2020-09-23 RX ADMIN — DEXAMETHASONE SODIUM PHOSPHATE 1 MG: 4 INJECTION, SOLUTION INTRAMUSCULAR; INTRAVENOUS at 07:10

## 2020-09-23 RX ADMIN — LIDOCAINE HYDROCHLORIDE 60 MG: 20 INJECTION, SOLUTION EPIDURAL; INFILTRATION; INTRACAUDAL; PERINEURAL at 07:24

## 2020-09-23 RX ADMIN — Medication 10 MG: at 07:45

## 2020-09-23 NOTE — ANESTHESIA PROCEDURE NOTES
Peripheral Block    Start time: 9/23/2020 7:10 AM  End time: 9/23/2020 7:12 AM  Performed by: Eleonora Garcia MD  Authorized by: Eleonora Garcia MD       Pre-procedure: Indications: at surgeon's request and post-op pain management    Preanesthetic Checklist: patient identified, risks and benefits discussed, site marked, timeout performed, anesthesia consent given and patient being monitored    Timeout Time: 07:10          Block Type:   Block Type:   Adductor canal  Laterality:  Left  Monitoring:  Standard ASA monitoring, responsive to questions, oxygen, continuous pulse ox, frequent vital sign checks and heart rate  Injection Technique:  Single shot  Procedures: ultrasound guided    Patient Position: supine  Prep: chlorhexidine    Location:  Lower thigh  Needle Type:  Stimuplex  Needle Gauge:  20 G  Needle Localization:  Ultrasound guidance    Assessment:  Number of attempts:  1  Injection Assessment:  Incremental injection every 5 mL, negative aspiration for CSF, no paresthesia, ultrasound image on chart, local visualized surrounding nerve on ultrasound, negative aspiration for blood and no intravascular symptoms  Patient tolerance:  Patient tolerated the procedure well with no immediate complications

## 2020-09-23 NOTE — ANESTHESIA PROCEDURE NOTES
Peripheral Block    Start time: 9/23/2020 7:07 AM  End time: 9/23/2020 7:10 AM  Performed by: Robinson Das MD  Authorized by: Robinson Das MD       Pre-procedure:    Indications: at surgeon's request and post-op pain management    Preanesthetic Checklist: patient identified, risks and benefits discussed, site marked, timeout performed, anesthesia consent given and patient being monitored    Timeout Time: 07:07          Block Type:   Block Type:  Popliteal  Laterality:  Left  Monitoring:  Standard ASA monitoring, responsive to questions, oxygen, continuous pulse ox, frequent vital sign checks and heart rate  Injection Technique:  Single shot  Procedures: ultrasound guided    Patient Position: supine  Prep: chlorhexidine    Location:  Lower thigh  Needle Type:  Stimuplex  Needle Gauge:  20 G  Needle Localization:  Ultrasound guidance    Assessment:  Number of attempts:  1  Injection Assessment:  Incremental injection every 5 mL, negative aspiration for CSF, no paresthesia, local visualized surrounding nerve on ultrasound, ultrasound image on chart, negative aspiration for blood and no intravascular symptoms  Patient tolerance:  Patient tolerated the procedure well with no immediate complications

## 2020-09-23 NOTE — DISCHARGE INSTRUCTIONS
1- Keep Splint Dry. Do not get wet. Cover with Cast bag while bathing  2- non-weight on operative extremity  3- Follow up in 2-3 weeks for suture removal  4- Use crutches, walker, knee roller to help ambulate as needed  5- For the 1st week be sure to elevate and ice the operative site. This helps with swelling         Learning About a Peripheral Nerve Block  What is a peripheral nerve block? For some surgeries, a doctor may do a peripheral nerve block to numb the part of the body involved, often an arm or a leg. Peripheral nerves lead from the spinal cord to other parts of the body, carrying signals for movement and feeling. The nerve block is sometimes used with medicine that makes you sleep during the surgery. Or the nerve block may be all that's needed, and you can stay awake without feeling any pain. The nerve block may also help keep your pain level lower after the surgery. How is a peripheral nerve block done? Before the nerve block, you will have a tube called an IV placed in your arm. This will be used to give you medicine to make you sleep or keep you comfortable. The doctor may use ultrasound, X-ray, or another imaging method to help guide the needle that will be used for the nerve block. After finding the right spot, the doctor uses a tiny needle to numb the skin. Then he or she puts the nerve block needle into the numbed area. If you are awake, you may feel some pressure. But you should not feel pain. What can you expect after a peripheral nerve block? The nerve block will leave the area that was numbed, like your arm or leg, partly or totally numb for a while. Your doctor will tell you for how long. Follow your doctor's instructions closely. If you'll be going home right after the surgery, you'll need someone to drive you. As the block wears off, you'll start to feel some pain from the surgery. Be sure to take your pain medicines before the pain gets bad.   What are the risks of a peripheral nerve block? You will be closely watched during the nerve block. That's because the anesthetics used for the nerve block may affect the central nervous system, cardiovascular system, and respiratory system (airway and lungs). They may also affect blood pressure, breathing, heartbeat, and other vital functions. As the needle is put in place during the nerve block, the nerve to be blocked may be touched with the tip of the needle. If this happens, you may feel a sharp sensation like an electrical shock in the part of the body supplied by the nerve. Be sure to let your doctor know if you feel this. Problems after a nerve block are rare. There is a small risk of seizures, heart problems, damage to nerves, infection, or bleeding. The benefits usually outweigh these risks. Follow-up care is a key part of your treatment and safety. Be sure to make and go to all appointments, and call your doctor if you are having problems. It's also a good idea to know your test results and keep a list of the medicines you take. Where can you learn more? Go to http://claire-jasmin.info/  Enter A594 in the search box to learn more about \"Learning About a Peripheral Nerve Block. \"  Current as of: November 20, 2019               Content Version: 12.6  © 3700-8580 Healthwise, Incorporated. Care instructions adapted under license by [employer] (which disclaims liability or warranty for this information). If you have questions about a medical condition or this instruction, always ask your healthcare professional. [place] [address] any warranty or liability for your use of this information. Learning About How to Use Crutches  Your Care Instructions  Crutches can help you walk when you have an injured hip, leg, knee, ankle, or foot. Your doctor will tell you how much weight--if any--you can put on your leg. Be sure your crutches fit you.  When you stand up in your normal posture, there should be space for two or three fingers between the top of the crutch and your armpit. When you let your hands hang down, the hand  should be at your wrists. When you put your hands on the hand , your elbows should be slightly bent. To stay safe when using crutches:  · Look straight ahead, not down at your feet. · Clear away small rugs, cords, or anything else that could cause you to trip, slip, or fall. · Be very careful around pets and small children. They can get in your path when you least expect it. · Be sure the rubber tips on your crutches are clean and in good condition to help prevent slipping. · Avoid slick conditions, such as wet floors and snowy or icy driveways. In bad weather, be especially careful on curbs and steps. How to use crutches  Getting ready to walk   1. Bend your elbows slightly. Press the padded top parts of the crutches against your sides, under your armpits. 2. If you have been told not to put any weight on your injured leg, keep that leg bent and off the ground. How to walk with crutches when you can put weight on the injured leg   Crutches allow you to take all the weight off of one leg. They can also be used as an added support if you have some injury or condition of both legs. Here's how to walk with crutches when you're able to put weight on your weak or injured leg. Be sure your crutches fit you. · When you stand up in your normal posture, there should be space for two or three fingers between the top of the crutch and your underarm. · When you let your hands hang down, the hand  should be at your wrists. · When you put your hands on the hand , your elbows should be slightly bent. 1. Put both crutches about 12 inches in front of you. 2. Put your weight on the handgrips, not on the pads under your arms. 3. Move your weak or injured leg forward so it's almost even with the crutches.   4. Bring your good leg up, so it's even with your weak or injured leg. 5. Move your crutches about 12 inches in front of you, and start the next step. The crutches and your feet should form a triangle. Hold the crutches close enough to your body so you can push straight down on them, but leave room between the crutches for your body to pass through. Don't lean forward to reach farther. Constant pressure against your underarms can cause numbness. When you're confident using the crutches, you can move the crutches and your injured leg at the same time. Then push straight down on the crutches as you step past the crutches with your strong leg, as you would in normal walking. Take small steps. Use ramps and elevators when you can. Sitting down   1. To sit, back up to the chair. Use one hand to hold both crutches by the handgrips, beside your injured leg. With the other hand, hold onto the seat and slowly lower yourself onto the chair. 2. Lay the crutches on the ground near your chair. If you prop them up, they may fall over. Getting up from a chair   1. To get up from a chair,  the crutches and put them in one hand beside your injured leg. 2. Put your weight on the handgrips of the crutches and on your strong leg to stand up. How to go up and down stairs using crutches   Here's how to go up and down stairs using crutches. Try this first with another person nearby to steady you if needed. 1. Stand near the edge of the stairs. 2. Go up or down the stairs. · If you are going up, step up with your stronger leg. Then bring the crutches and your weak or injured leg to the upper step. · If you are going down, put your crutches and your weak or injured leg on the lower step. Then bring your stronger leg down to the lower step. Remember \"up with the good, and down with the bad\" to help you lead with the correct leg. Crutches:  How to go up and down stairs with handrails   If the stairs have a good sturdy handrail, you can hold it with one hand and your crutches together in the other hand. Here's how. Try this first with another person nearby to steady you if needed. If the stairs have a handrail but you don't think it's sturdy enough, use the crutches normally, holding one in each hand. 1. Stand near the edge of the stairs. 2. Put both crutches under the arm opposite the handrail. 3. Use the hand opposite the handrail to hold both crutches by the handgrips. 4. Hold onto the handrail as you go up or down. 5. Go up or down the stairs. · If you are going up, step up with your stronger leg. Then bring the crutches and your weak or injured leg to the upper step. · If you are going down, put your crutches and your weak or injured leg on the lower step. Then bring your stronger leg down to the lower step. Remember \"up with the good, down with the bad\" to help you lead with the correct leg. Follow-up care is a key part of your treatment and safety. Be sure to make and go to all appointments, and call your doctor if you are having problems. It's also a good idea to know your test results and keep a list of the medicines you take. Where can you learn more? Go to http://claire-jasmin.info/  Enter G273 in the search box to learn more about \"Learning About How to Use Crutches. \"  Current as of: March 2, 2020               Content Version: 12.6  © 2006-2020 [employer]. Care instructions adapted under license by [employer] (which disclaims liability or warranty for this information). If you have questions about a medical condition or this instruction, always ask your healthcare professional. [address] any warranty or liability for your use of this information. Caring for Your Splint: After Your Visit  Your Care Instructions     A splint protects a broken bone or other injury.  If you have a removable splint, follow your doctor's instructions and only remove the splint if your doctor says you can. Most splints can be adjusted. Your doctor will show you how to do this and will tell you when you might need to adjust the splint. Many splints are premade. Your doctor may also make a splint from plaster or fiberglass. Some splints have a built-in air cushion. Air pads are inflated to hold the injured area in place. Follow-up care is a key part of your treatment and safety. Be sure to make and go to all appointments, and call your doctor if you are having problems. It's also a good idea to know your test results and keep a list of the medicines you take. How can you care for yourself at home? General care  · Don't put any weight on a splint. If you have a walking boot, your doctor will tell you when you can put weight on it. · If the fingers or toes on the limb with the splint were not injured, wiggle them every now and then. This helps move the blood and fluids in the injured limb. · Prop up the injured arm or leg on a pillow when you ice it or anytime you sit or lie down during the next 3 days. Try to keep it above the level of your heart. This will help reduce swelling. · Put ice or cold packs on the hurt area for 10 to 20 minutes at a time. Try to do this every 1 to 2 hours for the next 3 days (when you are awake) or until the swelling goes down. Be careful not to get the splint wet. · Be safe with medicines. Read and follow all instructions on the label. ¨ If the doctor gave you a prescription medicine for pain, take it as prescribed. ¨ If you are not taking a prescription pain medicine, ask your doctor if you can take an over-the-counter medicine. · Keep up your muscle strength and tone as much as you can while protecting your injured limb or joint. Your doctor may want you to tense and relax the muscles protected by the splint. Check with your doctor or physical therapist for instructions.   Splint and skin care  · If your splint is not to be removed, try blowing cool air from a hair dryer or fan into the splint to help relieve itching. Never stick items under your splint to scratch the skin. · Do not use oils or lotions near your splint. If the skin becomes red or sore around the edge of the splint, you may pad the edges with a soft material, such as moleskin, or use tape to cover the edges. · If you're allowed to take your splint off, be sure your skin is dry before you put it back on. · Watch for pressure sores. These can develop over bony areas. Symptoms include a warm spot under the cast, pain, drainage, or an odor. Call your doctor if you think you have a pressure sore. · Watch for compartment syndrome. This happens when pressure builds up in a group of muscles, nerves, and blood vessels. It is an emergency. Symptoms include severe pain or tingling or numbness. Water and your splint  · Keep your splint dry. Moisture can collect under the splint and cause skin irritation and itching. If you have a wound or have had surgery, moisture under the splint can increase the risk of infection. · Cover your splint with at least two layers of plastic when you take a shower or bath, unless your doctor said you can take it off while bathing. · If you can take the splint off when you bathe, pat the area dry after bathing and put the splint back on.  · If your splint gets a little wet, you can dry it with a hair dryer. Use a \"cool\" setting. When should you call for help? Call your doctor now or seek immediate medical care if:  · You have increased or severe pain. · You feel a warm or painful spot under the splint. · You have problems with your splint. For example:  ¨ The skin under the splint burns or stings. ¨ The splint feels too tight. ¨ There is a lot of swelling near the splint. (Some swelling is normal.)  ¨ You have a new fever. ¨ There is drainage or a bad smell coming from the splint. · Your foot or hand is cool or pale or changes color.   · You have trouble moving your fingers or toes. · You have symptoms of a blood clot in your arm or leg (called a deep vein thrombosis). These may include:  ¨ Pain in the arm, calf, back of the knee, thigh, or groin. ¨ Redness and swelling in the arm, leg, or groin. Watch closely for changes in your health, and be sure to contact your doctor if:  · The splint is breaking apart or losing its shape. · You are not getting better as expected. Where can you learn more? Go to [employer]  Enter C855 in the search box to learn more about \"Caring for Your Splint: After Your Visit. \"   © 6789-7981 Healthwise, Incorporated. Care instructions adapted under license by [employer] [place] [employer] (which disclaims liability or warranty for this information). This care instruction is for use with your licensed healthcare professional. If you have questions about a medical condition or this instruction, always ask your healthcare professional. [place] [address] any warranty or liability for your use of this information. Content Version: 87.7.618687; Current as of: June 4, 2014                                 ACTIVITY  · As tolerated and as directed by your doctor. · Bathe or shower as directed by your doctor. DIET  · Clear liquids until no nausea or vomiting; then light diet for the first day. · Advance to regular diet on second day, unless your doctor orders otherwise. · If nausea and vomiting continues, call your doctor. PAIN  · Take pain medication as directed by your doctor. · Call your doctor if pain is NOT relieved by medication. · DO NOT take aspirin of blood thinners unless directed by your doctor.             CALL YOUR DOCTOR IF   · Excessive bleeding that does not stop after holding pressure over the area  · Temperature of 101 degrees F or above  · Excessive redness, swelling or bruising, and/ or green or yellow, smelly discharge from incision    AFTER ANESTHESIA   · For the first 24 hours: DO NOT Drive, Drink alcoholic beverages, or Make important decisions. · Be aware of dizziness following anesthesia and while taking pain medication. DISCHARGE SUMMARY from Nurse    PATIENT INSTRUCTIONS:    After general anesthesia or intravenous sedation, for 24 hours or while taking prescription Narcotics:  · Limit your activities  · Do not drive and operate hazardous machinery  · Do not make important personal or business decisions  · Do  not drink alcoholic beverages  · If you have not urinated within 8 hours after discharge, please contact your surgeon on call. *  Please give a list of your current medications to your Primary Care Provider. *  Please update this list whenever your medications are discontinued, doses are      changed, or new medications (including over-the-counter products) are added. *  Please carry medication information at all times in case of emergency situations. These are general instructions for a healthy lifestyle:    No smoking/ No tobacco products/ Avoid exposure to second hand smoke    Surgeon General's Warning:  Quitting smoking now greatly reduces serious risk to your health. Obesity, smoking, and sedentary lifestyle greatly increases your risk for illness    A healthy diet, regular physical exercise & weight monitoring are important for maintaining a healthy lifestyle    You may be retaining fluid if you have a history of heart failure or if you experience any of the following symptoms:  Weight gain of 3 pounds or more overnight or 5 pounds in a week, increased swelling in our hands or feet or shortness of breath while lying flat in bed. Please call your doctor as soon as you notice any of these symptoms; do not wait until your next office visit.     Recognize signs and symptoms of STROKE:    F-face looks uneven    A-arms unable to move or move unevenly    S-speech slurred or non-existent    T-time-call 911 as soon as signs and symptoms begin-DO NOT go       Back to bed or wait to see if you get better-TIME IS BRAIN.

## 2020-09-23 NOTE — PERIOP NOTES
Dr Tarah Pace stopped by to check on pt, states pt and wife want to be DC'd to home due to  concerns of covid and being hospitalized. He will write DC orders

## 2020-09-23 NOTE — PERIOP NOTES
Pt dc to car with crutches, wife has DC instructions and already has prescriptions filled and follow up appointment scheduled.  Dr Reena Osborne informed pt ready for DC, states he will call him tonight, relayed message to pt and wife

## 2020-09-23 NOTE — ANESTHESIA POSTPROCEDURE EVALUATION
Procedure(s):  LEFT ANKLE TOTAL ARTHROPLASTY.     general    Anesthesia Post Evaluation      Multimodal analgesia: multimodal analgesia used between 6 hours prior to anesthesia start to PACU discharge  Patient location during evaluation: bedside  Patient participation: complete - patient participated  Level of consciousness: awake and alert  Pain management: adequate  Airway patency: patent  Anesthetic complications: no  Cardiovascular status: acceptable  Respiratory status: acceptable  Hydration status: acceptable  Post anesthesia nausea and vomiting:  controlled  Final Post Anesthesia Temperature Assessment:  Normothermia (36.0-37.5 degrees C)      INITIAL Post-op Vital signs:   Vitals Value Taken Time   /70 9/23/2020 10:34 AM   Temp 36.2 °C (97.1 °F) 9/23/2020 10:06 AM   Pulse 65 9/23/2020 10:34 AM   Resp 16 9/23/2020 10:34 AM   SpO2 97 % 9/23/2020 10:34 AM

## 2020-09-23 NOTE — ANESTHESIA PREPROCEDURE EVALUATION
Relevant Problems   No relevant active problems       Anesthetic History   No history of anesthetic complications            Review of Systems / Medical History  Patient summary reviewed and pertinent labs reviewed    Pulmonary  Within defined limits            Pertinent negatives: No smoker     Neuro/Psych   Within defined limits           Cardiovascular    Hypertension: well controlled              Exercise tolerance: >4 METS     GI/Hepatic/Renal  Within defined limits              Endo/Other  Within defined limits           Other Findings              Physical Exam    Airway  Mallampati: II  TM Distance: 4 - 6 cm         Cardiovascular    Rhythm: regular          Comments: Bradycardia.  Low 50s during preop interview Dental  No notable dental hx       Pulmonary  Breath sounds clear to auscultation               Abdominal  GI exam deferred       Other Findings            Anesthetic Plan    ASA: 2  Anesthesia type: general      Post-op pain plan if not by surgeon: peripheral nerve block single    Induction: Intravenous  Anesthetic plan and risks discussed with: Patient and Family

## 2020-09-23 NOTE — DISCHARGE SUMMARY
Patient underwent Left Total Ankle Replacement. Due to the Covid 19 pandemic and his age as a High Risk Population, the patient does not want to stay overnight in the hospital.  I agree with the patient that it is in his best option to go home today. We will Discharge him home today. The Covid pandemic is unprecedented territory and it is best for the patient and the general population to not increase his exposure risk.

## 2020-09-23 NOTE — INTERVAL H&P NOTE
Update History & Physical 
 
The Patient's History and Physical of September 22, The chart was reviewed with the patient and I examined the patient. There was no change. The surgical site was confirmed by the patient and me. Plan:  The risk, benefits, expected outcome, and alternative to the recommended procedure have been discussed with the patient. Patient understands and wants to proceed with the procedure.  
 
Electronically signed by Vivi Greenberg MD on 9/23/2020 at 7:05 AM

## 2020-09-23 NOTE — OP NOTES
Operative Note    Patient:Kyle Willis  MRN: [id number]    Date Of Surgery: 9/23/2020    Surgeon: Gisella Siddiqui MD    Assistant Surgeon: None    Procedure Performed:   1- left Total Tasha Arthroplasty [facility]-[id number]  2- Percutaneous Achilles tendon lengthening CPT - 02893    Pre Op Diagnosis:  Left ankle arthritis  Left equinus contracture    Post Op Diagnosis:   same    Implants:   Implant Name Type Inv. Item Serial No.  Lot No. LRB No. Used Action   PIN FIX DIA2. 4MM FOR PROPHECY INBONE TOT ANK SYS STNMN - [id number]  PIN FIX DIA2. 4MM FOR PROPHECY INBONE TOT ANK SYS [facility] [employer] [id number] Left 14 Implanted   PIN FIX LNG TALAR GUID FOR TOT ANK SYS PROPHECY INFIN - [id number]  PIN FIX LNG TALAR GUID FOR TOT ANK SYS PROPHECY INFIN  [employer] [id number] Left 2 Implanted   TRAY TIB SZ 5 STD STEMLESS JOHAN INFIN - [id number]  TRAY TIB SZ 5 STD STEMLESS JOHAN INFIN  [employer]WD [id number] Left 1 Implanted   COMPONENT TALAR SZ 4 ANK DOME PROPHECY INFIN - [id number]  COMPONENT TALAR SZ 4 ANK DOME PROPHECY INFIN  Im[employer]WD [id number] Left 1 Implanted   INSERT TIB SZ 4+ H9MM ANK POLYETH INFIN - [id number]  INSERT TIB SZ 4+ H9MM ANK POLYETH INFIN  [id number]WD [id number] Left 1 Implanted       Anesthesia:  General    Blood Loss:  50cc    Tourniquet:  Estimated 696 minutes    Complications:  none    Pre Operative Abx:   Ancef 2g    Specimens/Cultures:  none    Significant Findings:  Significant osteoarthritis of both the tibia and the talar joint. For the implant was in it was noted did not not reach adequate dorsiflexion and his Achilles tendon was very tight. Due to this Achilles contracture a triple Hendry tendo Achilles lengthening was performed. Pre Operative Course:  Gabriele Alcantara is a 79 y.o. male who has a longstanding history of left tibiotalar arthritis. He is unable to perform his daily activities.   He has tried medications and does not want to be in long-term bracing nor long-term medications. He decided to have a total ankle replacement. Operation In Detail:  Patient was evaluated in the preoperative area. The left lower extremity was marked by me. We had a long discussion about the procedure and postoperative protocols. The patient was then brought back to the operating room suite and placed in the operating room table. A timeout was taken to identify the patient, procedure being performed, and laterality. After this the patient was prepped and draped in the normal sterile fashion using a Betadine solution and/or a ChloraPrep solution. A timeout was then taken to identify the patient his name, date of birth, laterality, and procedure being performed. We also identified allergies and any concerns about the operation. Attention was then placed to the operative extremity. The left lower extremity was prepped and draped and then exsanguinated with a thigh tourniquet inflated to 250 mmHg. An anterior incision over the anterior tibialis tendon was made over the distal anterior tibia and over the tibiotalar joint on to the talar neck. The skin incision made with a knife and the superficial peroneal nerve was identified the distal aspect of this incision. It was protected and retracted laterally. The remaining incision of the deep fascia and extensor retinaculum onto the anterior tibialis tendon. Working to the floor the anterior tibialis tendon we dissected down to the anterior distal tibia, tibiotalar joint, and talar neck. The deep neurovascular bundle was identified throughout meticulous dissection, crossing veins cauterized, and then the neurovascular bundle retracted laterally. Soft tissue was taken off of the distal tibia and over the dorsal talus. Using the [facility] guide we pinned the custom guide into place.   X-rays confirmed good alignment and placement of this custom cutting jig. The jig was removed and then the true cutting guide was placed on top of the K wires. Another set of AP and lateral x-rays confirmed good placement of the jig and good alignment of the potential cuts. A jig allowed for corner  holes were drilled was placed first, drilled through, removed, and then another guide was placed over the K wires. A box cut of the distal tibia was made through the jig, the neurovascular protected, then the jig was removed. The bone that had been resected was also excised and pulled out of the joint. Attention was then placed to the talus. The prophecy guide was placed on the talus and pinned into place. AP and lateral x-rays confirmed good alignment of this guide and K wires appear to be appropriate. The prophecy guide was removed and the cutting guide was placed. A flat cut of the talus was made taking off a thin 2 mm wafer of bone at the talus. The bone was removed out and was then removed and then the wound was copiously irrigated out. We placed a tibial tray onto the K wires in the tibia and found to a standard size or be appropriate. Was decided a size 5 tibial tray would be needed. To this paid attention to the talus. Using a free-floating talus and a trial poly-we found the correct size talus to be a size 4. We pinned into place. Then we removed the trial sizer and placed the cutting guide over the K wires. We used a free the chamfer cut guide was placed onto the K wires and pinned in the place. We then made a posterior chamfer cut and remove this cutting guide. The datum was then placed onto the cutting jig and we reamed the top and anterior chamfers through the datum. After all the talar cuts were made we removed the cutting jig and devices to pin the jig into place. .  I used an osteotome, rongeur, and a free wall to clean up all the cuts off the talus.   After adequate bone resection of the talus was performed to the medial and lateral gutters. We then put a trial talar implant and trial poly-into place. This provided good sizing so we drilled through this trial talus. After the pegs were drilled the trial talus was removed, and we removed all the trial components. We irrigated out the entire joint and then placed the implant for the tibial tray. Using impactor impacted posterior part of the tibial tray into the tibial plafond, and in the anterior aspect. X-ray confirmed this was a good alignment and good seating of the tibial tray. We then put in the talar dome prosthesis and impacted it using the curved impactor and mallet. X-rays confirmed that it was seated well into the talus. We then trialed our poly-implants. We used a size 4+ poly-9 mm thickness. Once this was in place we range the ankle and I felt it was stable medial laterally but did not give us adequate dorsiflexion, and a 7 mm poly-did not give us adequate stability. So I performed a triple [place] tendo Achilles lengthening as the Achilles was felt to be tight. This allowed us to get good dorsiflexion with a 9 mm poly-. So now that the tibial tray and the talus component were intact, irrigated 1 more time and then placed the appropriate size four +9 mm poly-. Used the ankle in plantar flexion dorsiflexion tested medial lateral stability, all this was appropriate. We irrigated out the joint 1 last time and took final x-rays which showed appropriate alignment and placement of all the prostheses. The deep joint was closed with a Monocryl suture, then the extensor retinaculum and deep fascia closed with a Monocryl suture, and then the skin subcutaneously closed with Monocryl suture. We then used a zip tie system to finish closing the skin along with a Dermabond suture. The triple [place] incisions were closed with a Dermabond also. A sterile dressing was then applied to the leg and Posterior slab splint.   They were awoken from anesthesia and returned to the PACU without difficulty.     Post Operative Plan:   1- WB status: Non-Weight Bearing   2- Follow Up: 2-3 weeks  3- Immobilization/assistive devices: brace/splint  4- DVT px: ASA 325mg PO Daily   5- Pain Medication: pre op meds given in office

## 2021-12-15 ENCOUNTER — HOSPITAL ENCOUNTER (OUTPATIENT)
Dept: LAB | Age: 71
Discharge: HOME | End: 2021-12-15
Payer: MEDICARE

## 2021-12-15 LAB
APPEARANCE FLD: NORMAL
COLOR FLD: NORMAL
LYMPHOCYTES NFR BRONCH MANUAL: 67 %
MACROPHAGES NFR BRONCH MANUAL: 11 %
NEUTROPHILS NFR BRONCH MANUAL: 22 %
NUC CELL # FLD: 549 /CU MM
OTHER CELLS NFR BLD MANUAL: 7 %
RBC # FLD: 6000 /CU MM
SPECIMEN SOURCE FLD: NORMAL

## 2021-12-15 PROCEDURE — 89050 BODY FLUID CELL COUNT: CPT

## 2021-12-15 PROCEDURE — 89060 EXAM SYNOVIAL FLUID CRYSTALS: CPT

## 2021-12-15 PROCEDURE — 87075 CULTR BACTERIA EXCEPT BLOOD: CPT

## 2021-12-15 PROCEDURE — 87205 SMEAR GRAM STAIN: CPT

## 2021-12-16 LAB
BODY FLD TYPE: NORMAL
CRYSTALS FLD MICRO: NORMAL

## 2021-12-17 LAB
BACTERIA SPEC CULT: NORMAL
GRAM STN SPEC: NORMAL
GRAM STN SPEC: NORMAL
SERVICE CMNT-IMP: NORMAL

## 2021-12-22 LAB
BACTERIA SPEC CULT: NORMAL
SERVICE CMNT-IMP: NORMAL

## 2021-12-27 ENCOUNTER — HOSPITAL ENCOUNTER (OUTPATIENT)
Dept: NUCLEAR MEDICINE | Age: 71
Discharge: HOME | End: 2021-12-27
Attending: ORTHOPAEDIC SURGERY
Payer: MEDICARE

## 2021-12-27 DIAGNOSIS — Z96.652 STATUS POST TOTAL LEFT KNEE REPLACEMENT: ICD-10-CM

## 2021-12-27 DIAGNOSIS — Z96.659 FAILURE OF TOTAL KNEE REPLACEMENT, INITIAL ENCOUNTER (HCC): ICD-10-CM

## 2021-12-27 DIAGNOSIS — T84.018A FAILURE OF TOTAL KNEE REPLACEMENT, INITIAL ENCOUNTER (HCC): ICD-10-CM

## 2021-12-27 PROCEDURE — 78306 BONE IMAGING WHOLE BODY: CPT

## 2022-08-19 ENCOUNTER — OFFICE VISIT (OUTPATIENT)
Dept: ORTHOPEDIC SURGERY | Age: 72
End: 2022-08-19
Payer: MEDICARE

## 2022-08-19 DIAGNOSIS — Z96.651 STATUS POST TOTAL RIGHT KNEE REPLACEMENT: Primary | ICD-10-CM

## 2022-08-19 DIAGNOSIS — S72.431D: ICD-10-CM

## 2022-08-19 PROCEDURE — G8421 BMI NOT CALCULATED: HCPCS | Performed by: PHYSICIAN ASSISTANT

## 2022-08-19 PROCEDURE — 3017F COLORECTAL CA SCREEN DOC REV: CPT | Performed by: PHYSICIAN ASSISTANT

## 2022-08-19 PROCEDURE — G8428 CUR MEDS NOT DOCUMENT: HCPCS | Performed by: PHYSICIAN ASSISTANT

## 2022-08-19 PROCEDURE — 99214 OFFICE O/P EST MOD 30 MIN: CPT | Performed by: PHYSICIAN ASSISTANT

## 2022-08-19 PROCEDURE — 4004F PT TOBACCO SCREEN RCVD TLK: CPT | Performed by: PHYSICIAN ASSISTANT

## 2022-08-19 PROCEDURE — 1123F ACP DISCUSS/DSCN MKR DOCD: CPT | Performed by: PHYSICIAN ASSISTANT

## 2022-08-19 PROCEDURE — L1832 KO ADJ JNT POS R SUP PRE CST: HCPCS | Performed by: PHYSICIAN ASSISTANT

## 2022-08-19 NOTE — LETTER
DME Patient Authorization Form    Name: Tank Red  : 1950  MRN: [id number]   Age: 67 y.o. Gender: male  Delivery Address: [facility]     Diagnosis:     ICD-10-CM    1. Status post total right knee replacement  Z96.651 XR KNEE RIGHT (3 VIEWS)      2. Closed disp fx of medial condyle of right femur with routine healing  S72.431D            Requested DME:  Kiran Bansal- -9 (750.00) X Right  - right        Clinical Notes:     **Indicates non-covered items by insurance. Payment expected on date of service. Electronically signed by  Provider: _______________________________ Date: 2022                             [facility] Tax ID # [id number]        Durable Medical Equipment and/or Orthotics Patient Consent     I understand that my physician has prescribed this medical supply as part of my treatment plan as a matter of Medical Necessity.  I understand that I have a choice in where I receive my prescribed orthopedic supplies and/or services.  I authorize [facility] to furnish this service/product and to provide my insurance carrier with any information requested in order to process for payment.  I instruct my insurance carrier to pay [facility] directly for these services/products.  I understand that my insurance carrier may deny payment for this supply because it is a non-covered item, deemed not medically necessary or considered experimental.   I understand that any cost not covered by my insurance carrier will be solely my financial responsibility.  I have received the Supplier Standards and have reviewed them.  I have received the prescribed item and have been fully instructed on the proper use of the above services/products.    ______ (Patient Initials) I understand that all DME items are non-returnable after being dispensed.  Items still in sealed packaging may be returned up to 14 days after purchasing. [address] will replace items that are defective.    ______ (Patient Initials) I understand that Tiffany Fernandez will not file a claim with my insurance carrier for this service/product and I am waiving my right to file a claim on my own for this service/product with my insurance company as this item is NON-COVERED (Denoted by the **) by my Insurance company/policy. ______ (Patient Initials) I understand that I am responsible to bring my equipment to the hospital for any surgery. ______________________________________________  ________________________  Patient / Jimena Abraham            Thank you for considering [address] Your physician has prescribed specific medical equipment or devices for your home use. The following describes your rights and responsibilities as our customer. Right to Choose Providers: You have a choice regarding which company supplies your home medical equipment and devices, and to consult your physician in this decision. You may choose a medical supply store, a home medical equipment provider, or a specialist such as [facility]. [facility] will coordinate with your physician to provide the medical equipment or devices prescribed for your home use. Right to Service:  You have the right to considerate, respectful and nondiscriminatory care. You have the right to receive accurate and easily understood information about your health care. If you speak a foreign language, or don't understand the discussions, assistance will be provided to allow you to make informed health care decisions. You have the right to know your treatment options and to participate in decisions about your care, including the right to accept or refuse treatment.   You have the right to expect a reasonable response to your requests for treatment or service. You have the right to talk in confidence with health care providers and to have your health care information protected. You have the right to receive an explanation of your bill. You have the right to complain about the service or product you receive. Patient Responsibilities:  Please provide complete and accurate information about your health insurance benefits and make arrangements for the timely payment of your bill. [facility] will, if possible, assume responsibility for billing your insurance (Medicare, Medicaid and commercial) for the prescribed equipment or devices. If your policy does not cover the prescribed product, or only covers a portion of the bill, you are responsible for any remaining balance. Return and Exchange Policy:  [facility] will honor published  Warranties for products. [facility] will accept returns or exchanges within 14 days from the date of receipt, providin) the product must be in new condition; 2) receipt as required; and 3) used disposable and hygiene products may only be returned due to a defective product. Note: Refunds will be issued in a timely manner, please allow 4-6 weeks for processing. Complaint Procedures and DME Consumer Protection Resources:  [facility] values you as a customer, and is committed to resolving patient concerns. This commitment includes understanding and documenting your concerns, conducting a review of internal procedures, and providing you with an explanation and resolution to your concerns. Should you have any questions about our services or billing process, please contact our office at (practice phone number). If we are unable to resolve the concern, you have the right to direct comments to the office of Consumer Protection, in the [address] or the [facility] office, without fear of repercussion.     [address] [facility]    A supplier must be in compliance with all applicable Federal and State licensure and regulatory requirements. A supplier must provide complete and accurate information on the DMEPOS supplier application. Any changes to this information must be reported to the [facility] within 30 days. An authorized individual (one whose signature is binding) must sign the application for billing privileges. A supplier must fill orders from its own inventory, or must contract with other companies for the purchase of items necessary to fill the order. A supplier may not contract with any entity that is currently excluded from the Medicare program, any [facility] program, or from any other Federal procurement or Nonprocurement programs. A supplier must advise beneficiaries that they may rent or purchase inexpensive or routinely purchased durable medical equipment, and of the purchase option for capped rental equipment. A supplier must notify beneficiaries of warranty coverage and honor all warranties under applicable State Law, and repair or replace free of charge Medicare covered items that are under warranty. A supplier must maintain a physical facility on an appropriate site. A supplier must permit CMS, or its agents to conduct on-site inspections to ascertain the supplier's compliance with these standards. The supplier location must be accessible to beneficiaries during reasonable business hours, and must maintain a visible sign and posted hours of operation. A supplier must maintain a primary business telephone listed under the name of the business in a [employer] or a toll free number available through directory assistance. The exclusive use of a beeper, answering machine or cell phone is prohibited. A supplier must have comprehensive liability insurance in the amount of at least $300,000 that covers both the supplier's place of business and all customers and employees of the supplier.   If the a CMS-approved accreditation organization in order to receive and retain a supplier billing number. The accreditation must indicate the specific products and services, for which the supplier is accredited in order for the supplier to receive payment for those specific products and services. A DMEPOS supplier must notify their accreditation organization when a new DMEPOS location is opened. The accreditation organization may accredit the new supplier location for three months after it is operational without requiring a new site visit. All DMEPOS supplier locations, whether owned or subcontracted, must meet the [employer] and be separately accredited in order to bill Medicare. An accredited supplier may be denied enrollment or their enrollment may be revoked, if CMS determines that they are not in compliance with the DMEPOS quality standards. A DMEPOS supplier must disclose upon enrollment all products and services, including the addition of new product lines for which they are seeking accreditation. If a new product line is added after enrollment, the DMEPOS supplier will be responsible for notifying the accrediting body of the new product so that the DMEPOS supplier can be re-surveyed and accredited for these new products. Must meet the surety bond requirements specified in 42 C. F.R. 424.57(c). Implementation date- May 4, 2009. A supplier must obtain oxygen from a state-licensed oxygen supplier. A supplier must maintain ordering and referring documentation consistent with provisions found in 42 C. F.R. 424.516(f). DMEPOS suppliers are prohibited from sharing a practice location with certain other Medicare providers and suppliers. DMEPOS suppliers must remain open to the public for a minimum of 30 hours per week with certain exceptions.

## 2022-08-19 NOTE — PROGRESS NOTES
Name: Jesús Cuevas  YOB: 1950  Gender: male  MRN: [id number]    CC:   Chief Complaint   Patient presents with    Knee Pain     Right TKA-fall not CHK          HPI:   The patient presents today complaining of right knee pain after sustaining a fall 3-4 weeks ago. He states that he had a syncopal episode related to new onset A. fib which required hospitalization and he has recently been started on Eliquis. He states that his right knee pain is localized to the medial side of his knee and is improving but still have some residual swelling. He still experiences some soreness when firing his quads and when walking. He had right TKA in [facility] in 2008. He is previously been worked up by Dr. Anthony Purvis for instability of his left knee and was found to have some left knee tibial implant loosening on bone scan. He reports that his left knee is doing fairly well at this time. No other new complaints.     Current Outpatient Medications:     amLODIPine (NORVASC) 10 MG tablet, TAKE 1 TABLET BY MOUTH DAILY, Disp: , Rfl:     atenolol (TENORMIN) 100 MG tablet, TAKE 1 TABLET BY MOUTH DAILY, Disp: , Rfl:     zoster recombinant adjuvanted vaccine (SHINGRIX) 50 MCG/0.5ML SUSR injection, 0.5mL by IntraMUSCular route once now and then repeat in 2-6 months, Disp: , Rfl:   Allergies   Allergen Reactions    Ace Inhibitors Angioedema     Actually reaction was to Losartan    Losartan Swelling    Morphine Other (See Comments)     hallucinations     Past Medical History:   Diagnosis Date    Cancer ([address]     prostate    Hypertension     medication    Pancreatitis 2010    possible ETOH induced- X1     .pmh  Past Surgical History:   Procedure Laterality Date    CERVICAL FUSION      COLONOSCOPY  2016    normal    ORTHOPEDIC SURGERY      tamera knee replacements    PROSTATE CRYOABLATION      Still has a prostate     Family History   Problem Relation Age of Onset    Hypertension Mother     Diabetes Father     Stroke Sister Social History     Socioeconomic History    Marital status:      Spouse name: Not on file    Number of children: Not on file    Years of education: Not on file    Highest education level: Not on file   Occupational History    Not on file   Tobacco Use    Smoking status: Never    Smokeless tobacco: Never   Substance and Sexual Activity    Alcohol use: Yes     Alcohol/week: 1.0 standard drink    Drug use: Never    Sexual activity: Not on file   Other Topics Concern    Not on file   Social History Narrative    Not on file     Social Determinants of Health     Financial Resource Strain: Not on file   Food Insecurity: Not on file   Transportation Needs: Not on file   Physical Activity: Not on file   Stress: Not on file   Social Connections: Not on file   Intimate Partner Violence: Not on file   Housing Stability: Not on file       Review of Systems:  As per HPI. Pertinent positives and negatives are addressed with the patient, particularly those related to musculoskeletal concerns. Non-orthopaedic concerns were referred back to the primary care physician. PHYSICAL EXAMINATION:   The patient appears their stated age and they are in no distress. The lower extremities are as described below. Circulation is normal with palpable pedal pulses bilaterally and no edema. There is no lymph adenopathy in the popliteal or malleolar region. The skin is without stasis disease distally bilaterally. Hip ROM is smooth and painless. Knee range of motion is 0-120 degrees on the right and 0-120 degrees on the left.  right knee: There is 2mm of anterior/posterior translation and 2mm of medial/lateral instability bilaterally. There is 2 degrees of varus alignment in the right knee. There is no appreciable pain produced with varus or valgus stress testing in extension or mid flexion. There is tenderness to palpation over left medial femoral condyle. There is trace right knee joint effusion present.   Limb lengths are equal.  The gait is noted to be with a slight trendelenburg and antalgia. Straight leg test is negative. Quadriceps strength is good. Sensation is intact to light touch bilaterally. Their judgment and insight are normal.  They are oriented to time, place and person. Their memory is good and the mood and affect appropriate. X-RAY: Views of the right knee are reviewed. 3 views standing reveal good bone prosthetic interface with no suggestion of progressive lucency. There is evidence of a subacute and nondisplaced fracture involving right medial femoral condyle with some callus formation present. X-ray impression:  Stable right total knee arthroplasty with evidence of healing and relatively nondisplaced fracture involving right medial femoral condyle. IMPRESSION:    Diagnosis Orders   1. Status post total right knee replacement  XR KNEE RIGHT (3 VIEWS)      2. Closed disp fx of medial condyle of right femur with routine healing        . RECOMMENDATIONS:    Reviewed x-ray findings with the patient. Exam findings appear to correlate with plain film findings of a nondisplaced fracture involving right medial femoral condyle. There is no plain film evidence of implant loosening. As noted, patient reports that  his symptoms are improving. He appears to be  demonstrating satisfactory healing both from a clinical and radiographic standpoint. He was placed in a hinged DonJoy playmaker knee brace today for added comfort and protection. He is also instructed to offload weight with a cane as needed if he experiences increased right knee soreness. He will follow-up in 4 weeks Dr. Luís Lim for routine recheck and repeat right knee x-rays. Approximately 30 minutes was spent reviewing the medical record, imaging, performing history and physical examination, discussing the diagnosis and treatment plan with the patient, and completing documentation for this visit.

## 2022-08-19 NOTE — PROGRESS NOTES
Patient was fitted and instructed on a Donjoy Playmaker Knee Brace for the right knee. Patient read and signed documenting they understand and agree to [facility]'s current DME return policy.

## 2022-09-16 ENCOUNTER — OFFICE VISIT (OUTPATIENT)
Dept: ORTHOPEDIC SURGERY | Age: 72
End: 2022-09-16
Payer: MEDICARE

## 2022-09-16 DIAGNOSIS — Z96.651 STATUS POST TOTAL KNEE REPLACEMENT, RIGHT: Primary | ICD-10-CM

## 2022-09-16 DIAGNOSIS — Z96.652 HISTORY OF TOTAL LEFT KNEE REPLACEMENT: ICD-10-CM

## 2022-09-16 DIAGNOSIS — S72.434D CLOSED NONDISPLACED FRACTURE OF MEDIAL CONDYLE OF RIGHT FEMUR WITH ROUTINE HEALING, SUBSEQUENT ENCOUNTER: ICD-10-CM

## 2022-09-16 PROCEDURE — 3017F COLORECTAL CA SCREEN DOC REV: CPT | Performed by: ORTHOPAEDIC SURGERY

## 2022-09-16 PROCEDURE — 4004F PT TOBACCO SCREEN RCVD TLK: CPT | Performed by: ORTHOPAEDIC SURGERY

## 2022-09-16 PROCEDURE — 1123F ACP DISCUSS/DSCN MKR DOCD: CPT | Performed by: ORTHOPAEDIC SURGERY

## 2022-09-16 PROCEDURE — 27508 TREATMENT OF THIGH FRACTURE: CPT | Performed by: ORTHOPAEDIC SURGERY

## 2022-09-16 PROCEDURE — G8421 BMI NOT CALCULATED: HCPCS | Performed by: ORTHOPAEDIC SURGERY

## 2022-09-16 PROCEDURE — 99213 OFFICE O/P EST LOW 20 MIN: CPT | Performed by: ORTHOPAEDIC SURGERY

## 2022-09-16 PROCEDURE — G8428 CUR MEDS NOT DOCUMENT: HCPCS | Performed by: ORTHOPAEDIC SURGERY

## 2022-09-16 NOTE — PROGRESS NOTES
CC: Follow-up on right knee periprosthetic femur fracture      HPI: Patient is now 8 weeks status post closed treatment of right nondisplaced medial femoral condyle fracture. He has history of right TKA performed in [address] [facility] in 2008. He maintained in the playmaker brace until approximately a week ago. He reports soreness and discomfort has essentially resolved and he has returned to regular activities. No other new complaints. ROS: As per HPI; otherwise, 10 point review of systems is negative. PM SH: Reviewed and unchanged. PE: Patient is alert and oriented, in no acute distress. Ambulates with normal reciprocal gait. Examination of right knee reveals overlying skin intact in good condition with well-healed midline incision present. Patient is able to actively extend to 0 and flex to 120 degrees fully with no pain. 2 mm A/P translation and 2 mm varus/valgus noted. Right knee joint is nontender to palpation. Neurovascular intact throughout. X-ray: AP and lateral view of right knee obtained today reveal during total knee arthroplasty with implants well-seated in their respective osseous structures. There is interval healing with callus present around nondisplaced right medial femoral condyle fracture. X-ray impression: Stable right total knee arthroplasty with well-healing medial femoral condyle fracture    Assessment: Right static medial femoral condyle fracture- healing well    Plan: Reviewed x-ray findings with the patient. He continues to demonstrate satisfactory healing by from a clinical and radiographic standpoint. He will continue to advance activity as tolerated and may follow-up for routine recheck and x-rays in 2 years or sooner, if needed. Aware his left knee does have tibial loosening. He is aware he has about 3 degrees medial lateral laxity here. We discussed the fact that this will eventually require revision. I think best to have a repeat x-ray in 9 months.   We

## 2023-01-25 ENCOUNTER — OFFICE VISIT (OUTPATIENT)
Dept: ORTHOPEDIC SURGERY | Age: 73
End: 2023-01-25

## 2023-01-25 DIAGNOSIS — Z96.653 STATUS POST TOTAL BILATERAL KNEE REPLACEMENT: Primary | ICD-10-CM

## 2023-01-25 NOTE — PROGRESS NOTES
Name: Anton Mancilla  YOB: 1950  Gender: male  MRN: [id number]    CC:   Chief Complaint   Patient presents with    Knee Pain     Follow up Bilateral TKA (done outsite POA) Right condyle Fx? DIAGNOSIS:   Encounter Diagnosis   Name Primary? Status post total bilateral knee replacement Yes        HPI:   The pain has been present for months and is becoming worse. It hurts at night when sleeping. The pain is located over the knee. It does hurt to walk and gets worse with increased distances. The pain does not radiate down the leg. Numbness and tingling are not noted. Treatment so far has been lateral knee arthroplasties. Work-up revealed a loose left tibial component. No infection with the aspirate. He just has an occasional episode of pain in the left knee when he sits too long. He is got some trips planned with considerable amount of hiking.       Current Outpatient Medications:     amLODIPine (NORVASC) 10 MG tablet, TAKE 1 TABLET BY MOUTH DAILY, Disp: , Rfl:     atenolol (TENORMIN) 100 MG tablet, TAKE 1 TABLET BY MOUTH DAILY, Disp: , Rfl:     zoster recombinant adjuvanted vaccine (SHINGRIX) 50 MCG/0.5ML SUSR injection, 0.5mL by IntraMUSCular route once now and then repeat in 2-6 months, Disp: , Rfl:   Allergies   Allergen Reactions    Ace Inhibitors Angioedema     Actually reaction was to Losartan    Losartan Swelling    Morphine Other (See Comments)     hallucinations     Past Medical History:   Diagnosis Date    Cancer ([facility] [address]     prostate    Hypertension     medication    Pancreatitis 2010    possible ETOH induced- X1     .pmh  Past Surgical History:   Procedure Laterality Date    CERVICAL FUSION      COLONOSCOPY  2016    normal    ORTHOPEDIC SURGERY      tamera knee replacements    PROSTATE CRYOABLATION      Still has a prostate     Family History   Problem Relation Age of Onset    Hypertension Mother     Diabetes Father     Stroke Sister      Social History     Socioeconomic History    Marital status:      Spouse name: Not on file    Number of children: Not on file    Years of education: Not on file    Highest education level: Not on file   Occupational History    Not on file   Tobacco Use    Smoking status: Never    Smokeless tobacco: Never   Substance and Sexual Activity    Alcohol use: Yes     Alcohol/week: 1.0 standard drink    Drug use: Never    Sexual activity: Not on file   Other Topics Concern    Not on file   Social History Narrative    Not on file     Social Determinants of Health     Financial Resource Strain: Not on file   Food Insecurity: Not on file   Transportation Needs: Not on file   Physical Activity: Not on file   Stress: Not on file   Social Connections: Not on file   Intimate Partner Violence: Not on file   Housing Stability: Not on file       Review of Systems:  As per HPI. Pertinent positives and negatives are addressed with the patient, particularly those related to musculoskeletal concerns. Non-orthopaedic concerns were referred back to the primary care physician. PHYSICAL EXAMINATION:   The patient appears their stated age and they are in no distress. The lower extremities are as described below. Circulation is normal with palpable pedal pulses bilaterally and no edema. There is no lymph adenopathy in the popliteal or malleolar region. The skin is without stasis disease distally bilaterally. Hip ROM is smooth and painless. Knee range of motion is 0-120 degrees on the right and 0-120 degrees on the left. bilateral knee: There is 2mm of anterior/posterior translation and 2mm of medial/lateral instability bilaterally. There is 2 degrees of varus alignment in the bilateral knee. There is some pain to palpation over the medial joint line. Limb lengths are equal.  The gait is noted to be with a slight trendelenburg and antalgia. Straight leg test is negative. Quadriceps strength is good. Sensation is intact to light touch bilaterally.   Their judgment and insight are normal.  They are oriented to time, place and person. Their memory is good and the mood and affect appropriate. X-RAY: Views of the bilateral knee are reviewed. 3 views standing reveal good bone prosthetic interface with suggestion of progressive lucency on the left side that was diagnosed previous exam involving the tibia. The right knee implants appear stable. The condyle fracture is healed. Niki Kruger impression:  Stable right knee arthroplasty and loose tibial baseplate left total knee arthroplasty    IMPRESSION:    Diagnosis Orders   1. Status post total bilateral knee replacement  XR Knee Bilateral Standard      . RECOMMENDATIONS:    Reviewed x-ray findings with the patient. Today we discussed conservative treatments such as NSAIDs and PT. To date these have not been effective for the patient. He just does not have any limiting symptoms. He is got no instability in full extension. He may go several more years with this presentation or he may have troubles in a few months. We discussed the indication to return when should be swelling or pain or limitations and compromise. He was reassured about the epicondylar fracture being stable with no concern on the right side. His implants are stable    Make sure he plans on driving to [employer] and being quite active. He would certainly want to see me if he did not think he had the endurance to do such as he warrants through what his summer activities would be. But again he may be stable in this configuration for several more years. TKA - Today we also discussed vision knee replacement surgery. They are aware of the 1% risk of infection. They were also informed of the possibility of stroke, heart attack and blood clot; any of which could result in further hospitalization or death. I just think he needs to have more symptomatology before he would feel a benefit from revision surgery.     Approximately 30 minutes was spent reviewing the medical record, imaging, performing history and physical examination, discussing the diagnosis and treatment plan with the patient, and completing documentation for this visit.

## 2024-01-22 ENCOUNTER — CLINICAL DOCUMENTATION (OUTPATIENT)
Dept: CASE MANAGEMENT | Age: 74
End: 2024-01-22

## 2024-01-22 SDOH — ECONOMIC STABILITY: HOUSING INSECURITY
IN THE LAST 12 MONTHS, WAS THERE A TIME WHEN YOU DID NOT HAVE A STEADY PLACE TO SLEEP OR SLEPT IN A SHELTER (INCLUDING NOW)?: NO

## 2024-01-22 SDOH — ECONOMIC STABILITY: INCOME INSECURITY: IN THE LAST 12 MONTHS, WAS THERE A TIME WHEN YOU WERE NOT ABLE TO PAY THE MORTGAGE OR RENT ON TIME?: NO

## 2024-01-22 SDOH — ECONOMIC STABILITY: TRANSPORTATION INSECURITY
IN THE PAST 12 MONTHS, HAS THE LACK OF TRANSPORTATION KEPT YOU FROM MEDICAL APPOINTMENTS OR FROM GETTING MEDICATIONS?: NO

## 2024-01-22 SDOH — ECONOMIC STABILITY: FOOD INSECURITY: WITHIN THE PAST 12 MONTHS, YOU WORRIED THAT YOUR FOOD WOULD RUN OUT BEFORE YOU GOT MONEY TO BUY MORE.: NEVER TRUE

## 2024-01-22 SDOH — ECONOMIC STABILITY: TRANSPORTATION INSECURITY
IN THE PAST 12 MONTHS, HAS LACK OF TRANSPORTATION KEPT YOU FROM MEETINGS, WORK, OR FROM GETTING THINGS NEEDED FOR DAILY LIVING?: NO

## 2024-01-22 SDOH — ECONOMIC STABILITY: FOOD INSECURITY: WITHIN THE PAST 12 MONTHS, THE FOOD YOU BOUGHT JUST DIDN'T LAST AND YOU DIDN'T HAVE MONEY TO GET MORE.: NEVER TRUE

## 2024-01-22 ASSESSMENT — PATIENT HEALTH QUESTIONNAIRE - PHQ9
2. FEELING DOWN, DEPRESSED OR HOPELESS: NOT AT ALL
2. FEELING DOWN, DEPRESSED OR HOPELESS: NOT AT ALL
SUM OF ALL RESPONSES TO PHQ QUESTIONS 1-9: 0
SUM OF ALL RESPONSES TO PHQ9 QUESTIONS 1 & 2: 0
SUM OF ALL RESPONSES TO PHQ QUESTIONS 1-9: 0
SUM OF ALL RESPONSES TO PHQ9 QUESTIONS 1 & 2: 0
SUM OF ALL RESPONSES TO PHQ QUESTIONS 1-9: 0
1. LITTLE INTEREST OR PLEASURE IN DOING THINGS: NOT AT ALL
2. FEELING DOWN, DEPRESSED OR HOPELESS: 0
SUM OF ALL RESPONSES TO PHQ9 QUESTIONS 1 & 2: 0
1. LITTLE INTEREST OR PLEASURE IN DOING THINGS: 0
SUM OF ALL RESPONSES TO PHQ QUESTIONS 1-9: 0
1. LITTLE INTEREST OR PLEASURE IN DOING THINGS: NOT AT ALL

## 2024-01-22 ASSESSMENT — SOCIAL DETERMINANTS OF HEALTH (SDOH)
WITHIN THE LAST YEAR, HAVE YOU BEEN KICKED, HIT, SLAPPED, OR OTHERWISE PHYSICALLY HURT BY YOUR PARTNER OR EX-PARTNER?: NO
HOW HARD IS IT FOR YOU TO PAY FOR THE VERY BASICS LIKE FOOD, HOUSING, MEDICAL CARE, AND HEATING?: NOT HARD AT ALL
WITHIN THE LAST YEAR, HAVE YOU BEEN HUMILIATED OR EMOTIONALLY ABUSED IN OTHER WAYS BY YOUR PARTNER OR EX-PARTNER?: NO
WITHIN THE LAST YEAR, HAVE YOU BEEN AFRAID OF YOUR PARTNER OR EX-PARTNER?: NO
WITHIN THE LAST YEAR, HAVE TO BEEN RAPED OR FORCED TO HAVE ANY KIND OF SEXUAL ACTIVITY BY YOUR PARTNER OR EX-PARTNER?: NO

## 2024-01-22 ASSESSMENT — LIFESTYLE VARIABLES
HOW OFTEN DO YOU HAVE A DRINK CONTAINING ALCOHOL: NEVER
HOW MANY STANDARD DRINKS CONTAINING ALCOHOL DO YOU HAVE ON A TYPICAL DAY: PATIENT DOES NOT DRINK

## 2024-01-22 NOTE — PROGRESS NOTES
1/22/2024 Lung Navigation intake complete.  Reviewed role of navigation, gave contact information for navigators, and reviewed upcoming appointments.  Patient retired in 2008 from [employer]. Relocated to [place] from [facility] for his wife's job.  Independent in self care, no physical limitations. Reports that he is recovering well from surgery.  Barriers: No financial, psychosocial,or transportation barriers noted.  Lives with his wife, Kiki Walsh who is his primary support person.    Verbal permission given to speak with Kiki Walsh.    Social Determinants of Health     Tobacco Use: Low Risk  (1/22/2024)    Patient History     Smoking Tobacco Use: Never     Smokeless Tobacco Use: Never     Passive Exposure: Not on file   Alcohol Use: Not At Risk (1/22/2024)    AUDIT-C     Frequency of Alcohol Consumption: Never     Average Number of Drinks: Patient does not drink     Frequency of Binge Drinking: Never   Financial Resource Strain: Low Risk  (1/22/2024)    Overall Financial Resource Strain (CARDIA)     Difficulty of Paying Living Expenses: Not hard at all   Food Insecurity: No Food Insecurity (1/22/2024)    Hunger Vital Sign     Worried About Running Out of Food in the Last Year: Never true     Ran Out of Food in the Last Year: Never true   Transportation Needs: No Transportation Needs (1/22/2024)    PRAPARE - Transportation     Lack of Transportation (Medical): No     Lack of Transportation (Non-Medical): No   Physical Activity: Not on file   Stress: No Stress Concern Present (1/22/2024)    [employer] [place] [employer] - Occupational Stress Questionnaire     Feeling of Stress : Not at all   Social Connections: Not on file   Intimate Partner Violence: Not At Risk (1/22/2024)    Humiliation, Afraid, Rape, and Kick questionnaire     Fear of Current or Ex-Partner: No     Emotionally Abused: No     Physically Abused: No     Sexually Abused: No   Depression: Not at risk (1/22/2024)    PHQ-2     PHQ-2 Score: 0

## 2024-01-24 NOTE — PROGRESS NOTES
thoracic surgery.  A bronchoscopy with EBUS was recommended which showed patient to have typical carcinoid.    A robotic resection was recommended: patient underwent a robotic left lower lobe segmentectomy. He did well postoperatively.  He returns for a follow-up visit with a chest x-ray showing a worse pleural effusion.   Underwent an ultrasound-guided thoracentesis.   He says he started feeling better right after that.   He has continued to feel well.   He has no significant shortness of breath.   He does have some chest wall pain and paresthesias.   He has no significant cough or hemoptysis.   He has no other issues or complaints in the office today.    Surgery Date:  10/3/2023: EBUS  11/3/2023: Robotic left upper lobe wedge resection, left lower lobe basilar segmentectomy, mediastinal lymph node dissection    Staging:  T3N0 left lower lobe typical carcinoid    Assessment/Plan:   Patient with a left lower lobe T3N0 typical carcinoid. Left pleural effusion. S/p thoracentesis, now much improved.   Recommend follow-up in 2 months with CT chest.    A referral has been placed with [facility] for a Medical Oncology transfer of care, consultation, and treatment.      Notes from Referring Provider: N/A    Presented at Tumor Board: No    Other Pertinent Information: N/A

## 2024-01-25 NOTE — PROGRESS NOTES
[facility]: Office Visit New Patient H & P    Reason for visit:  Lung cancer -Typical carcinoid      Overview:  Surgery Date:   1. 10/3/2023: EBUS  2. 11/3/2023: Robotic left upper lobe wedge resection, left lower lobe basilar segmentectomy, mediastinal lymph node dissection    Staging:  T3N0 left lower lobe typical carcinoid     History of Present Illness:  Mr. Walsh is a 74 years old male patient, never smoker with medical problems including history of prostate cancer status post robotic assisted prostatectomy in 2010 ([facility]) who presents as transfer of care from [facility] due to change in healthcare insurance plan for recently diagnosed typical carcinoid of lung.  He was noted to have a 2.2 cm left lower lobe noncalcified pulmonary nodule on CTA chest in July 2022.  PET/CT in August 2022 showed only faint FDG avidity and therefore close CT surveillance was recommended.  Patient did not follow-up until 7/28/2023 plan with CT chest without contrast showed an increase in the size of the nodule to 3.1 cm.  He underwent bronchoscopy and biopsy on 10/3/2023.  Aspiration cytology with cell block from left lower lobe of lung returned as neuroendocrine tumor (carcinoid tumor).  Ki-67 proliferation index was 1%.  The findings were reported to favor typical carcinoid tumor.  On 11/3/2023, he underwent left upper lobe wedge resection, left lower lobe basilar segmentectomy and mediastinal lymph node dissection.  Pathology from left lower lobe basilar segmentectomy returned as neuroendocrine tumor, grade 1, 6.5 cm in greatest dimension, negative for visceropleural invasion, lymphovascular invasion.  Margins negative.  24 lymph nodes removed, all of them were negative for malignancy.  Final pathologic stage was pT3 pN0.  Patient is accompanied by his wife today.  He denies any cough, shortness of breath, wheezing, flushing, diarrhea, headache, unintentional weight loss, chest pain.  He

## 2024-01-26 ENCOUNTER — HOSPITAL ENCOUNTER (OUTPATIENT)
Dept: LAB | Age: 74
End: 2024-01-26
Payer: MEDICARE

## 2024-01-26 ENCOUNTER — OFFICE VISIT (OUTPATIENT)
Dept: ONCOLOGY | Age: 74
End: 2024-01-26
Payer: MEDICARE

## 2024-01-26 VITALS
WEIGHT: 187.9 LBS | HEART RATE: 65 BPM | OXYGEN SATURATION: 97 % | TEMPERATURE: 98.2 F | DIASTOLIC BLOOD PRESSURE: 61 MMHG | BODY MASS INDEX: 27.83 KG/M2 | SYSTOLIC BLOOD PRESSURE: 112 MMHG | RESPIRATION RATE: 16 BRPM | HEIGHT: 69 IN

## 2024-01-26 DIAGNOSIS — Z85.46 H/O PROSTATE CANCER: ICD-10-CM

## 2024-01-26 DIAGNOSIS — D3A.090 CARCINOID TUMOR DETERMINED BY BIOPSY OF LUNG: Primary | ICD-10-CM

## 2024-01-26 DIAGNOSIS — D3A.090 CARCINOID TUMOR DETERMINED BY BIOPSY OF LUNG: ICD-10-CM

## 2024-01-26 LAB
ANION GAP SERPL CALC-SCNC: 7 MMOL/L (ref 2–11)
BUN SERPL-MCNC: 11 MG/DL (ref 8–23)
CALCIUM SERPL-MCNC: 9.2 MG/DL (ref 8.3–10.4)
CHLORIDE SERPL-SCNC: 106 MMOL/L (ref 103–113)
CO2 SERPL-SCNC: 28 MMOL/L (ref 21–32)
CREAT SERPL-MCNC: 0.9 MG/DL (ref 0.8–1.5)
GLUCOSE SERPL-MCNC: 111 MG/DL (ref 65–100)
POTASSIUM SERPL-SCNC: 3.4 MMOL/L (ref 3.5–5.1)
PSA SERPL-MCNC: <0.1 NG/ML
SODIUM SERPL-SCNC: 141 MMOL/L (ref 136–146)

## 2024-01-26 PROCEDURE — 36415 COLL VENOUS BLD VENIPUNCTURE: CPT

## 2024-01-26 PROCEDURE — 86316 IMMUNOASSAY TUMOR OTHER: CPT

## 2024-01-26 PROCEDURE — 84153 ASSAY OF PSA TOTAL: CPT

## 2024-01-26 PROCEDURE — 1123F ACP DISCUSS/DSCN MKR DOCD: CPT | Performed by: INTERNAL MEDICINE

## 2024-01-26 PROCEDURE — 99204 OFFICE O/P NEW MOD 45 MIN: CPT | Performed by: INTERNAL MEDICINE

## 2024-01-26 PROCEDURE — 80048 BASIC METABOLIC PNL TOTAL CA: CPT

## 2024-01-26 RX ORDER — CARVEDILOL 25 MG/1
25 TABLET ORAL 2 TIMES DAILY
COMMUNITY
Start: 2023-07-19 | End: 2024-07-18

## 2024-01-26 NOTE — PATIENT INSTRUCTIONS
Patient Instructions from Today's Visit    Reason for Visit:  Carcinoid/Transfer of Care    Diagnosis Information:  https://www.cancer.net/about-us/asco-answers-patient-education-materials/akrx-afjddjp-hdce-sheets  Patient was educated and given handouts published by [employer] entitled “[employer] Answers Fact Sheets” about their diagnosis of Carcinoid during today’s office visit.     Plan:  CT Scan of the Neck, Chest, Abdomen and Pelvis  Chromogranin-A and PSA today    Follow Up:  Office visit with Dr Dykes in a month    Recent Lab Results:  No visits with results within 3 Day(s) from this visit.   Latest known visit with results is:   Orders Only on 09/11/2020   Component Date Value Ref Range Status    SARS-CoV-2, IVORY 09/11/2020 Not Detected  Not Detected Final    Comment: This nucleic acid amplification test was developed and its performance  characteristics determined by [facility]. Nucleic acid  amplification tests include PCR and TMA. This test has not been FDA  cleared or approved. This test has been authorized by FDA under an  Emergency Use Authorization (EUA). This test is only authorized for  the duration of time the declaration that circumstances exist  justifying the authorization of the emergency use of in vitro  diagnostic tests for detection of SARS-CoV-2 virus and/or diagnosis  of COVID-19 infection under section 564(b)(1) of the Act, 21 U.S.C.  360bbb-3(b) (1), unless the authorization is terminated or revoked  sooner.  When diagnostic testing is negative, the possibility of a false  negative result should be considered in the context of a patient's  recent exposures and the presence of clinical signs and symptoms  consistent with COVID-19. An individual without symptoms of COVID-19  and who is not shedding SARS-CoV-2 virus would                            expect to have a  negative (not detected) result in this assay.       Treatment Summary has been discussed and given to patient:

## 2024-01-30 LAB — CGA SERPL-SCNC: 37.2 NG/ML (ref 0–101.8)

## 2024-02-05 ENCOUNTER — HOSPITAL ENCOUNTER (OUTPATIENT)
Dept: CT IMAGING | Age: 74
Discharge: HOME | End: 2024-02-07
Payer: MEDICARE

## 2024-02-05 DIAGNOSIS — D3A.090 CARCINOID TUMOR DETERMINED BY BIOPSY OF LUNG: ICD-10-CM

## 2024-02-05 PROCEDURE — 6360000004 HC RX CONTRAST MEDICATION: Performed by: INTERNAL MEDICINE

## 2024-02-05 PROCEDURE — 74177 CT ABD & PELVIS W/CONTRAST: CPT

## 2024-02-05 RX ADMIN — DIATRIZOATE MEGLUMINE AND DIATRIZOATE SODIUM 15 ML: 660; 100 LIQUID ORAL; RECTAL at 10:22

## 2024-02-05 RX ADMIN — IOPAMIDOL 100 ML: 755 INJECTION, SOLUTION INTRAVENOUS at 10:22

## 2024-02-06 ENCOUNTER — TELEPHONE (OUTPATIENT)
Dept: ONCOLOGY | Age: 74
End: 2024-02-06

## 2024-02-06 DIAGNOSIS — D3A.090 CARCINOID TUMOR DETERMINED BY BIOPSY OF LUNG: Primary | ICD-10-CM

## 2024-02-06 DIAGNOSIS — E04.1 THYROID NODULE: ICD-10-CM

## 2024-02-06 NOTE — TELEPHONE ENCOUNTER
Discussed recent CT scan results w/ pt and informed of need for thyroid bx.  PT verbalized understanding.  Orders entered/faxed to IR

## 2024-02-07 ENCOUNTER — TELEPHONE (OUTPATIENT)
Dept: ONCOLOGY | Age: 74
End: 2024-02-07

## 2024-02-07 NOTE — TELEPHONE ENCOUNTER
Contacted pt regarding biopsy scheduled for next week.  Provided him w/ apt date/time - Informed pt that IR dpt will be calling him closer to the apt date to discuss further instructions.   Pt verbalized understanding.

## 2024-02-14 ENCOUNTER — HOSPITAL ENCOUNTER (OUTPATIENT)
Dept: ULTRASOUND IMAGING | Age: 74
Discharge: HOME | End: 2024-02-17
Attending: INTERNAL MEDICINE
Payer: MEDICARE

## 2024-02-14 VITALS
HEART RATE: 54 BPM | RESPIRATION RATE: 16 BRPM | OXYGEN SATURATION: 99 % | TEMPERATURE: 97.2 F | DIASTOLIC BLOOD PRESSURE: 79 MMHG | SYSTOLIC BLOOD PRESSURE: 165 MMHG

## 2024-02-14 DIAGNOSIS — E04.1 THYROID NODULE: ICD-10-CM

## 2024-02-14 DIAGNOSIS — D3A.090 CARCINOID TUMOR DETERMINED BY BIOPSY OF LUNG: ICD-10-CM

## 2024-02-14 PROCEDURE — 10005 FNA BX W/US GDN 1ST LES: CPT

## 2024-02-14 PROCEDURE — 88173 CYTOPATH EVAL FNA REPORT: CPT

## 2024-02-14 PROCEDURE — 2500000003 HC RX 250 WO HCPCS: Performed by: PHYSICIAN ASSISTANT

## 2024-02-14 RX ORDER — LIDOCAINE HYDROCHLORIDE 20 MG/ML
INJECTION, SOLUTION INFILTRATION; PERINEURAL PRN
Status: DISCONTINUED | OUTPATIENT
Start: 2024-02-14 | End: 2024-02-18 | Stop reason: HOSPADM

## 2024-02-14 RX ADMIN — LIDOCAINE HYDROCHLORIDE 5 ML: 20 INJECTION, SOLUTION INFILTRATION; PERINEURAL at 14:38

## 2024-02-14 NOTE — DISCHARGE INSTRUCTIONS
If you have any questions about your procedure, please call the Interventional Radiology department at [phone].     After business hours (5pm) and weekends, call the answering service at [phone] and ask for the Radiologist on call to be paged.         Si tiene Preguntas acerca del procedimiento, por favor llame al departamento de Radiología Intervencional al [phone].     Después de horas de oficina (5 pm) y los fines de semana, llamar al servicio de llamadas al [phone] y pregunte por el Radiologo de aristides.

## 2024-02-14 NOTE — FLOWSHEET NOTE
Recovery period without difficulty. Pt alert and oriented and denies pain. Dressing is clean, dry, and intact. Patient ambulatory off the unit with spouse. No distress observed.

## 2024-02-16 DIAGNOSIS — D3A.090 CARCINOID TUMOR DETERMINED BY BIOPSY OF LUNG: Primary | ICD-10-CM

## 2024-02-25 PROBLEM — I48.0 PAROXYSMAL ATRIAL FIBRILLATION (HCC): Status: ACTIVE | Noted: 2024-02-25

## 2024-02-25 PROBLEM — I77.810 ASCENDING AORTA DILATATION (HCC): Status: ACTIVE | Noted: 2024-02-25

## 2024-02-25 PROBLEM — R60.0 LEG EDEMA: Status: ACTIVE | Noted: 2024-02-25

## 2024-02-25 PROBLEM — R55 SYNCOPE: Status: ACTIVE | Noted: 2024-02-25

## 2024-02-25 PROBLEM — I10 PRIMARY HYPERTENSION: Status: ACTIVE | Noted: 2024-02-25

## 2024-02-25 NOTE — PROGRESS NOTES
[address], [address]  [place] [address]  PHONE: [phone]        NAME:  Juvencio Walsh  : 1950  MRN: [id number]     PCP:  John Lyn Jr., MD    SUBJECTIVE:   Juvencio Walsh is a 74 y.o. male seen for a consultation visit regarding the following:     Chief Complaint   Patient presents with    Consultation    Atrial Fibrillation        HPI:  Consultation is requested by Dr. Lyn for evaluation of Consultation and Atrial Fibrillation     He presents establish new cardiac care.  He saw Dr. Irene at [facility] in 2022 with a history of incidentally discovered A-fib at a visit with his primary care physician.  He carries a history of hypertension, prior syncope, and lower extremity dependent edema.  Echocardiogram in 2022 at [facility] showed normal LV function at 60 to 65%, indeterminate left ventricular diastolic function, mildly dilated ascending aorta but no significant valvular abnormalities.  ECG at that time showed normal sinus rhythm.  Blood pressure was not controlled at that time and hydralazine was increased to 100 mg twice daily.  Eliquis was continued at that visit.    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   No adverse cardiac events around time of surgery. Doing well since recovery without interval angina, CHF, palpitations, edema, presyncope or syncope.  Vitals controlled and tolerating meds well. Staying active without any significant limitations.        Past Medical History, Past Surgical History, Family history, Social History, and Medications were all reviewed with the patient today and updated as necessary.     Current Outpatient Medications   Medication Sig Dispense Refill    Multiple Vitamin (MULTIVITAMIN ADULT PO) Take by mouth      hydroCHLOROthiazide (HYDRODIURIL) 25 MG tablet Take 1 tablet by mouth every morning 90 tablet 3    carvedilol (COREG) 25 MG tablet Take 1 tablet by mouth

## 2024-02-26 ENCOUNTER — INITIAL CONSULT (OUTPATIENT)
Age: 74
End: 2024-02-26
Payer: MEDICARE

## 2024-02-26 VITALS
SYSTOLIC BLOOD PRESSURE: 120 MMHG | BODY MASS INDEX: 28.04 KG/M2 | DIASTOLIC BLOOD PRESSURE: 64 MMHG | HEART RATE: 62 BPM | WEIGHT: 185 LBS | HEIGHT: 68 IN

## 2024-02-26 DIAGNOSIS — I48.0 PAROXYSMAL ATRIAL FIBRILLATION (HCC): Primary | ICD-10-CM

## 2024-02-26 DIAGNOSIS — I77.810 ASCENDING AORTA DILATATION (HCC): ICD-10-CM

## 2024-02-26 DIAGNOSIS — I10 PRIMARY HYPERTENSION: ICD-10-CM

## 2024-02-26 DIAGNOSIS — R60.0 LEG EDEMA: ICD-10-CM

## 2024-02-26 DIAGNOSIS — R55 SYNCOPE, UNSPECIFIED SYNCOPE TYPE: ICD-10-CM

## 2024-02-26 PROCEDURE — 3078F DIAST BP <80 MM HG: CPT | Performed by: INTERNAL MEDICINE

## 2024-02-26 PROCEDURE — 3074F SYST BP LT 130 MM HG: CPT | Performed by: INTERNAL MEDICINE

## 2024-02-26 PROCEDURE — 1123F ACP DISCUSS/DSCN MKR DOCD: CPT | Performed by: INTERNAL MEDICINE

## 2024-02-26 PROCEDURE — 99204 OFFICE O/P NEW MOD 45 MIN: CPT | Performed by: INTERNAL MEDICINE

## 2024-02-26 PROCEDURE — 93000 ELECTROCARDIOGRAM COMPLETE: CPT | Performed by: INTERNAL MEDICINE

## 2024-02-26 RX ORDER — HYDROCHLOROTHIAZIDE 25 MG/1
25 TABLET ORAL EVERY MORNING
Qty: 90 TABLET | Refills: 3
Start: 2024-02-26

## 2024-02-28 ENCOUNTER — HOSPITAL ENCOUNTER (OUTPATIENT)
Dept: LAB | Age: 74
Discharge: HOME | End: 2024-03-02
Payer: MEDICARE

## 2024-02-28 ENCOUNTER — OFFICE VISIT (OUTPATIENT)
Dept: ONCOLOGY | Age: 74
End: 2024-02-28
Payer: MEDICARE

## 2024-02-28 VITALS
RESPIRATION RATE: 16 BRPM | HEIGHT: 69 IN | OXYGEN SATURATION: 99 % | HEART RATE: 61 BPM | BODY MASS INDEX: 27.59 KG/M2 | DIASTOLIC BLOOD PRESSURE: 76 MMHG | TEMPERATURE: 97.9 F | WEIGHT: 186.3 LBS | SYSTOLIC BLOOD PRESSURE: 117 MMHG

## 2024-02-28 DIAGNOSIS — E04.1 THYROID NODULE: ICD-10-CM

## 2024-02-28 DIAGNOSIS — D3A.090 CARCINOID TUMOR DETERMINED BY BIOPSY OF LUNG: ICD-10-CM

## 2024-02-28 DIAGNOSIS — E04.1 THYROID NODULE: Primary | ICD-10-CM

## 2024-02-28 LAB
ALBUMIN SERPL-MCNC: 4 G/DL (ref 3.2–4.6)
ALBUMIN/GLOB SERPL: 1.1 (ref 0.4–1.6)
ALP SERPL-CCNC: 55 U/L (ref 50–136)
ALT SERPL-CCNC: 22 U/L (ref 12–65)
ANION GAP SERPL CALC-SCNC: 3 MMOL/L (ref 2–11)
AST SERPL-CCNC: 14 U/L (ref 15–37)
BASOPHILS # BLD: 0 K/UL (ref 0–0.2)
BASOPHILS NFR BLD: 0 % (ref 0–2)
BILIRUB SERPL-MCNC: 0.6 MG/DL (ref 0.2–1.1)
BUN SERPL-MCNC: 11 MG/DL (ref 8–23)
CALCIUM SERPL-MCNC: 9.3 MG/DL (ref 8.3–10.4)
CHLORIDE SERPL-SCNC: 105 MMOL/L (ref 103–113)
CO2 SERPL-SCNC: 30 MMOL/L (ref 21–32)
CREAT SERPL-MCNC: 1 MG/DL (ref 0.8–1.5)
DIFFERENTIAL METHOD BLD: ABNORMAL
EOSINOPHIL # BLD: 0.2 K/UL (ref 0–0.8)
EOSINOPHIL NFR BLD: 2 % (ref 0.5–7.8)
ERYTHROCYTE [DISTWIDTH] IN BLOOD BY AUTOMATED COUNT: 13.2 % (ref 11.9–14.6)
GLOBULIN SER CALC-MCNC: 3.6 G/DL (ref 2.8–4.5)
GLUCOSE SERPL-MCNC: 99 MG/DL (ref 65–100)
HCT VFR BLD AUTO: 44.3 % (ref 41.1–50.3)
HGB BLD-MCNC: 14.3 G/DL (ref 13.6–17.2)
IMM GRANULOCYTES # BLD AUTO: 0 K/UL (ref 0–0.5)
IMM GRANULOCYTES NFR BLD AUTO: 0 % (ref 0–5)
LYMPHOCYTES # BLD: 1.7 K/UL (ref 0.5–4.6)
LYMPHOCYTES NFR BLD: 23 % (ref 13–44)
MCH RBC QN AUTO: 26.8 PG (ref 26.1–32.9)
MCHC RBC AUTO-ENTMCNC: 32.3 G/DL (ref 31.4–35)
MCV RBC AUTO: 83.1 FL (ref 82–102)
MONOCYTES # BLD: 0.4 K/UL (ref 0.1–1.3)
MONOCYTES NFR BLD: 6 % (ref 4–12)
NEUTS SEG # BLD: 5 K/UL (ref 1.7–8.2)
NEUTS SEG NFR BLD: 69 % (ref 43–78)
NRBC # BLD: 0 K/UL (ref 0–0.2)
PLATELET # BLD AUTO: 214 K/UL (ref 150–450)
PMV BLD AUTO: 9.1 FL (ref 9.4–12.3)
POTASSIUM SERPL-SCNC: 3.6 MMOL/L (ref 3.5–5.1)
PROT SERPL-MCNC: 7.6 G/DL (ref 6.3–8.2)
RBC # BLD AUTO: 5.33 M/UL (ref 4.23–5.6)
SODIUM SERPL-SCNC: 138 MMOL/L (ref 136–146)
TSH W FREE THYROID IF ABNORMAL: 0.95 UIU/ML (ref 0.36–3)
WBC # BLD AUTO: 7.3 K/UL (ref 4.3–11.1)

## 2024-02-28 PROCEDURE — 80053 COMPREHEN METABOLIC PANEL: CPT

## 2024-02-28 PROCEDURE — 1123F ACP DISCUSS/DSCN MKR DOCD: CPT | Performed by: INTERNAL MEDICINE

## 2024-02-28 PROCEDURE — 85025 COMPLETE CBC W/AUTO DIFF WBC: CPT

## 2024-02-28 PROCEDURE — 99214 OFFICE O/P EST MOD 30 MIN: CPT | Performed by: INTERNAL MEDICINE

## 2024-02-28 PROCEDURE — 86316 IMMUNOASSAY TUMOR OTHER: CPT

## 2024-02-28 PROCEDURE — 36415 COLL VENOUS BLD VENIPUNCTURE: CPT

## 2024-02-28 PROCEDURE — 84443 ASSAY THYROID STIM HORMONE: CPT

## 2024-02-28 PROCEDURE — 3078F DIAST BP <80 MM HG: CPT | Performed by: INTERNAL MEDICINE

## 2024-02-28 PROCEDURE — 3074F SYST BP LT 130 MM HG: CPT | Performed by: INTERNAL MEDICINE

## 2024-02-28 ASSESSMENT — PATIENT HEALTH QUESTIONNAIRE - PHQ9
SUM OF ALL RESPONSES TO PHQ QUESTIONS 1-9: 0
SUM OF ALL RESPONSES TO PHQ9 QUESTIONS 1 & 2: 0
SUM OF ALL RESPONSES TO PHQ QUESTIONS 1-9: 0
SUM OF ALL RESPONSES TO PHQ QUESTIONS 1-9: 0
2. FEELING DOWN, DEPRESSED OR HOPELESS: 0
SUM OF ALL RESPONSES TO PHQ QUESTIONS 1-9: 0
1. LITTLE INTEREST OR PLEASURE IN DOING THINGS: 0

## 2024-02-28 NOTE — PATIENT INSTRUCTIONS
02/28/2024 7.6  6.3 - 8.2 g/dL Final    Albumin 02/28/2024 4.0  3.2 - 4.6 g/dL Final    Globulin 02/28/2024 3.6  2.8 - 4.5 g/dL Final    Albumin/Globulin Ratio 02/28/2024 1.1  0.4 - 1.6   Final    WBC 02/28/2024 7.3  4.3 - 11.1 K/uL Final    RBC 02/28/2024 5.33  4.23 - 5.6 M/uL Final    Hemoglobin 02/28/2024 14.3  13.6 - 17.2 g/dL Final    Hematocrit 02/28/2024 44.3  41.1 - 50.3 % Final    MCV 02/28/2024 83.1  82.0 - 102.0 FL Final    MCH 02/28/2024 26.8  26.1 - 32.9 PG Final    MCHC 02/28/2024 32.3  31.4 - 35.0 g/dL Final    RDW 02/28/2024 13.2  11.9 - 14.6 % Final    Platelets 02/28/2024 214  150 - 450 K/uL Final    MPV 02/28/2024 9.1 (L)  9.4 - 12.3 FL Final    nRBC 02/28/2024 0.00  0.0 - 0.2 K/uL Final    **Note: Absolute NRBC parameter is now reported with Hemogram**    Differential Type 02/28/2024 AUTOMATED    Final    Neutrophils % 02/28/2024 69  43 - 78 % Final    Lymphocytes % 02/28/2024 23  13 - 44 % Final    Monocytes % 02/28/2024 6  4.0 - 12.0 % Final    Eosinophils % 02/28/2024 2  0.5 - 7.8 % Final    Basophils % 02/28/2024 0  0.0 - 2.0 % Final    Immature Granulocytes 02/28/2024 0  0.0 - 5.0 % Final    Neutrophils Absolute 02/28/2024 5.0  1.7 - 8.2 K/UL Final    Lymphocytes Absolute 02/28/2024 1.7  0.5 - 4.6 K/UL Final    Monocytes Absolute 02/28/2024 0.4  0.1 - 1.3 K/UL Final    Eosinophils Absolute 02/28/2024 0.2  0.0 - 0.8 K/UL Final    Basophils Absolute 02/28/2024 0.0  0.0 - 0.2 K/UL Final    Absolute Immature Granulocyte 02/28/2024 0.0  0.0 - 0.5 K/UL Final         Treatment Summary has been discussed and given to patient: N/A        -------------------------------------------------------------------------------------------------------------------  Please call our office at [phone] if you have any  of the following symptoms:   Fever of 100.5 or greater  Chills  Shortness of breath  Swelling or pain in one leg    After office hours an answering service is available and will contact a

## 2024-02-28 NOTE — PROGRESS NOTES
Received verbal order from Dr. Dykes to add TSH w/ reflex to today's labs and CT NCAP to be completed in 4 mo

## 2024-02-28 NOTE — PROGRESS NOTES
[facility]: Office Visit New Patient H & P    Reason for visit:  Lung cancer -Typical carcinoid      Overview:  Surgery Date:   1. 10/3/2023: EBUS  2. 11/3/2023: Robotic left upper lobe wedge resection, left lower lobe basilar segmentectomy, mediastinal lymph node dissection    Staging:  T3N0 left lower lobe typical carcinoid     History of Present Illness:  Mr. Walsh is a 74 years old male patient, never smoker with medical problems including history of prostate cancer status post robotic assisted prostatectomy in 2010 ([facility]) who presents as transfer of care from [facility] due to change in healthcare insurance plan for recently diagnosed typical carcinoid of lung.  He was noted to have a 2.2 cm left lower lobe noncalcified pulmonary nodule on CTA chest in July 2022.  PET/CT in August 2022 showed only faint FDG avidity and therefore close CT surveillance was recommended.  Patient did not follow-up until 7/28/2023 plan with CT chest without contrast showed an increase in the size of the nodule to 3.1 cm.  He underwent bronchoscopy and biopsy on 10/3/2023.  Aspiration cytology with cell block from left lower lobe of lung returned as neuroendocrine tumor (carcinoid tumor).  Ki-67 proliferation index was 1%.  The findings were reported to favor typical carcinoid tumor.  On 11/3/2023, he underwent left upper lobe wedge resection, left lower lobe basilar segmentectomy and mediastinal lymph node dissection.  Pathology from left lower lobe basilar segmentectomy returned as neuroendocrine tumor, grade 1, 6.5 cm in greatest dimension, negative for visceropleural invasion, lymphovascular invasion.  Margins negative.  24 lymph nodes removed, all of them were negative for malignancy.  Final pathologic stage was pT3 pN0.  Patient is accompanied by his wife today.  He denies any cough, shortness of breath, wheezing, flushing, diarrhea, headache, unintentional weight loss, chest pain.  He

## 2024-03-01 LAB — CGA SERPL-SCNC: 36.3 NG/ML (ref 0–101.8)

## 2024-05-14 ENCOUNTER — TELEPHONE (OUTPATIENT)
Age: 74
End: 2024-05-14

## 2024-05-14 NOTE — TELEPHONE ENCOUNTER
----- Message from Nolan Peters MD sent at 5/14/2024  8:09 AM EDT -----  Echo looks good. No major abnormalities. Continue current meds and keep follow up visit.

## 2024-05-30 ENCOUNTER — OFFICE VISIT (OUTPATIENT)
Dept: ORTHOPEDIC SURGERY | Age: 74
End: 2024-05-30
Payer: MEDICARE

## 2024-05-30 DIAGNOSIS — G89.29 CHRONIC PAIN OF LEFT KNEE: ICD-10-CM

## 2024-05-30 DIAGNOSIS — Z96.652 HISTORY OF TOTAL LEFT KNEE REPLACEMENT: Primary | ICD-10-CM

## 2024-05-30 DIAGNOSIS — M25.562 CHRONIC PAIN OF LEFT KNEE: ICD-10-CM

## 2024-05-30 PROCEDURE — 99214 OFFICE O/P EST MOD 30 MIN: CPT | Performed by: PHYSICIAN ASSISTANT

## 2024-05-30 PROCEDURE — 1123F ACP DISCUSS/DSCN MKR DOCD: CPT | Performed by: PHYSICIAN ASSISTANT

## 2024-05-30 NOTE — PROGRESS NOTES
Name: Juvencio Walsh  YOB: 1950  Gender: male  MRN: [id number]    CC:   Chief Complaint   Patient presents with    Follow-up     Left knee pain will xray today- prev TKA          DIAGNOSIS:   Encounter Diagnoses   Name Primary?    History of total left knee replacement Yes    Chronic pain of left knee         HPI:   The patient returns today for follow-up on left knee pain.  He reports left knee pain remains mostly mild in severity and unchanged.  He was noted on plain film x-rays in January 2023 to have loose tibial baseplate as was demonstrated on bone scan from 2021.  Previous left knee aspirate from 2021 was negative for infection.  He reports some mild associated left knee swelling at times.  Denies any overlying redness or increased warmth.  It hurts at night when sleeping.  The pain is located over the knee.  It does hurt to walk and gets worse with increased distances.  He primarily of left knee discomfort when he has been sitting for prolonged periods.  The pain does not radiate down the leg.  Numbness and tingling are not noted.   Treatment so far has been activity modification, Tylenol, and NSAIDs.       Current Outpatient Medications:     Multiple Vitamin (MULTIVITAMIN ADULT PO), Take by mouth, Disp: , Rfl:     hydroCHLOROthiazide (HYDRODIURIL) 25 MG tablet, Take 1 tablet by mouth every morning, Disp: 90 tablet, Rfl: 3    carvedilol (COREG) 25 MG tablet, Take 1 tablet by mouth 2 times daily, Disp: , Rfl:     apixaban (ELIQUIS) 5 MG TABS tablet, Take 1 tablet by mouth 2 times daily, Disp: , Rfl:     amLODIPine (NORVASC) 10 MG tablet, TAKE 1 TABLET BY MOUTH DAILY, Disp: , Rfl:     zoster recombinant adjuvanted vaccine (SHINGRIX) 50 MCG/0.5ML SUSR injection, 0.5mL by IntraMUSCular route once now and then repeat in 2-6 months (Patient not taking: Reported on 1/26/2024), Disp: , Rfl:   Allergies   Allergen Reactions    Ace Inhibitors Angioedema     Actually reaction was to Losartan    Other

## 2024-07-08 ENCOUNTER — HOSPITAL ENCOUNTER (OUTPATIENT)
Dept: CT IMAGING | Age: 74
Discharge: HOME | End: 2024-07-10
Payer: MEDICARE

## 2024-07-08 DIAGNOSIS — D3A.090 CARCINOID TUMOR DETERMINED BY BIOPSY OF LUNG: ICD-10-CM

## 2024-07-08 DIAGNOSIS — E04.1 THYROID NODULE: ICD-10-CM

## 2024-07-08 PROCEDURE — 6360000004 HC RX CONTRAST MEDICATION: Performed by: INTERNAL MEDICINE

## 2024-07-08 PROCEDURE — 74177 CT ABD & PELVIS W/CONTRAST: CPT

## 2024-07-08 RX ADMIN — IOPAMIDOL 100 ML: 755 INJECTION, SOLUTION INTRAVENOUS at 10:53

## 2024-07-08 RX ADMIN — DIATRIZOATE MEGLUMINE AND DIATRIZOATE SODIUM 15 ML: 660; 100 LIQUID ORAL; RECTAL at 10:52

## 2024-07-09 DIAGNOSIS — E04.1 THYROID NODULE: Primary | ICD-10-CM

## 2024-07-09 DIAGNOSIS — D3A.090 CARCINOID TUMOR DETERMINED BY BIOPSY OF LUNG: ICD-10-CM

## 2024-07-09 NOTE — PROGRESS NOTES
weight loss, chest pain.  He appears to have recovered well from recent surgery         Interval history:    Interval history updated in the assessment and plan.    Review of Systems:  14 point ROS was negative except as per HPI      ECOG PERFORMANCE STATUS - 0-Fully active, able to carry on all pre-disease performance without restriction.    Pain - /10. None/Minimal pain - not affecting QOL       Past Medical History:   Diagnosis Date    Cancer (HCC)     prostate    Hypertension     medication    Pancreatitis 2010    possible ETOH induced- X1          Reviewed and updated this visit by provider:  Tobacco  Allergies  Meds  Problems  Med Hx  Surg Hx  Fam Hx          Current Outpatient Medications   Medication Sig Dispense Refill    Multiple Vitamin (MULTIVITAMIN ADULT PO) Take by mouth      hydroCHLOROthiazide (HYDRODIURIL) 25 MG tablet Take 1 tablet by mouth every morning 90 tablet 3    carvedilol (COREG) 25 MG tablet Take 1 tablet by mouth 2 times daily      apixaban (ELIQUIS) 5 MG TABS tablet Take 1 tablet by mouth 2 times daily      amLODIPine (NORVASC) 10 MG tablet TAKE 1 TABLET BY MOUTH DAILY      zoster recombinant adjuvanted vaccine (SHINGRIX) 50 MCG/0.5ML SUSR injection 0.5mL by IntraMUSCular route once now and then repeat in 2-6 months (Patient not taking: Reported on 1/26/2024)       No current facility-administered medications for this visit.     Facility-Administered Medications Ordered in Other Visits   Medication Dose Route Frequency Provider Last Rate Last Admin    diatrizoate meglumine-sodium (GASTROGRAFIN) 66-10 % solution 15 mL  15 mL Oral ONCE PRN Anival Dykes MD   15 mL at 07/08/24 1052        OBJECTIVE:  /66 (Site: Left Upper Arm, Position: Sitting)   Pulse 61   Temp 97.7 °F (36.5 °C) (Oral)   Resp 16   Ht 1.742 m (5' 8.6\")   Wt 83.5 kg (184 lb)   SpO2 97%   BMI 27.49 kg/m²   Wt Readings from Last 3 Encounters:   07/10/24 83.5 kg (184 lb)   05/13/24 84.5 kg (186 lb 4.6 oz)

## 2024-07-10 ENCOUNTER — HOSPITAL ENCOUNTER (OUTPATIENT)
Dept: LAB | Age: 74
Discharge: HOME | End: 2024-07-13
Payer: MEDICARE

## 2024-07-10 ENCOUNTER — OFFICE VISIT (OUTPATIENT)
Dept: ONCOLOGY | Age: 74
End: 2024-07-10
Payer: MEDICARE

## 2024-07-10 VITALS
WEIGHT: 184 LBS | DIASTOLIC BLOOD PRESSURE: 66 MMHG | TEMPERATURE: 97.7 F | HEIGHT: 69 IN | HEART RATE: 61 BPM | RESPIRATION RATE: 16 BRPM | SYSTOLIC BLOOD PRESSURE: 126 MMHG | BODY MASS INDEX: 27.25 KG/M2 | OXYGEN SATURATION: 97 %

## 2024-07-10 DIAGNOSIS — D3A.090 CARCINOID TUMOR DETERMINED BY BIOPSY OF LUNG: ICD-10-CM

## 2024-07-10 DIAGNOSIS — Z85.46 H/O PROSTATE CANCER: ICD-10-CM

## 2024-07-10 DIAGNOSIS — E04.1 THYROID NODULE: Primary | ICD-10-CM

## 2024-07-10 DIAGNOSIS — E04.1 THYROID NODULE: ICD-10-CM

## 2024-07-10 LAB
ALBUMIN SERPL-MCNC: 4.3 G/DL (ref 3.2–4.6)
ALBUMIN/GLOB SERPL: 1.5 (ref 1–1.9)
ALP SERPL-CCNC: 62 U/L (ref 40–129)
ALT SERPL-CCNC: 18 U/L (ref 12–65)
ANION GAP SERPL CALC-SCNC: 12 MMOL/L (ref 9–18)
AST SERPL-CCNC: 25 U/L (ref 15–37)
BASOPHILS # BLD: 0 K/UL (ref 0–0.2)
BASOPHILS NFR BLD: 0 % (ref 0–2)
BILIRUB SERPL-MCNC: 0.4 MG/DL (ref 0–1.2)
BUN SERPL-MCNC: 13 MG/DL (ref 8–23)
CALCIUM SERPL-MCNC: 9.6 MG/DL (ref 8.8–10.2)
CHLORIDE SERPL-SCNC: 102 MMOL/L (ref 98–107)
CO2 SERPL-SCNC: 29 MMOL/L (ref 20–28)
CREAT SERPL-MCNC: 0.88 MG/DL (ref 0.8–1.3)
DIFFERENTIAL METHOD BLD: NORMAL
EOSINOPHIL # BLD: 0.2 K/UL (ref 0–0.8)
EOSINOPHIL NFR BLD: 3 % (ref 0.5–7.8)
ERYTHROCYTE [DISTWIDTH] IN BLOOD BY AUTOMATED COUNT: 12.8 % (ref 11.9–14.6)
GLOBULIN SER CALC-MCNC: 2.8 G/DL (ref 2.3–3.5)
GLUCOSE SERPL-MCNC: 96 MG/DL (ref 70–99)
HCT VFR BLD AUTO: 43.8 % (ref 41.1–50.3)
HGB BLD-MCNC: 14 G/DL (ref 13.6–17.2)
IMM GRANULOCYTES # BLD AUTO: 0 K/UL (ref 0–0.5)
IMM GRANULOCYTES NFR BLD AUTO: 0 % (ref 0–5)
LYMPHOCYTES # BLD: 1.2 K/UL (ref 0.5–4.6)
LYMPHOCYTES NFR BLD: 17 % (ref 13–44)
MCH RBC QN AUTO: 27.1 PG (ref 26.1–32.9)
MCHC RBC AUTO-ENTMCNC: 32 G/DL (ref 31.4–35)
MCV RBC AUTO: 84.9 FL (ref 82–102)
MONOCYTES # BLD: 0.5 K/UL (ref 0.1–1.3)
MONOCYTES NFR BLD: 7 % (ref 4–12)
NEUTS SEG # BLD: 5.4 K/UL (ref 1.7–8.2)
NEUTS SEG NFR BLD: 73 % (ref 43–78)
NRBC # BLD: 0 K/UL (ref 0–0.2)
PLATELET # BLD AUTO: 187 K/UL (ref 150–450)
PMV BLD AUTO: 10.3 FL (ref 9.4–12.3)
POTASSIUM SERPL-SCNC: 3.6 MMOL/L (ref 3.5–5.1)
PROT SERPL-MCNC: 7 G/DL (ref 6.3–8.2)
RBC # BLD AUTO: 5.16 M/UL (ref 4.23–5.6)
SODIUM SERPL-SCNC: 143 MMOL/L (ref 136–145)
TSH W FREE THYROID IF ABNORMAL: 1.83 UIU/ML (ref 0.27–4.2)
WBC # BLD AUTO: 7.4 K/UL (ref 4.3–11.1)

## 2024-07-10 PROCEDURE — 3074F SYST BP LT 130 MM HG: CPT | Performed by: INTERNAL MEDICINE

## 2024-07-10 PROCEDURE — 84443 ASSAY THYROID STIM HORMONE: CPT

## 2024-07-10 PROCEDURE — 80053 COMPREHEN METABOLIC PANEL: CPT

## 2024-07-10 PROCEDURE — 3078F DIAST BP <80 MM HG: CPT | Performed by: INTERNAL MEDICINE

## 2024-07-10 PROCEDURE — 36415 COLL VENOUS BLD VENIPUNCTURE: CPT

## 2024-07-10 PROCEDURE — 1123F ACP DISCUSS/DSCN MKR DOCD: CPT | Performed by: INTERNAL MEDICINE

## 2024-07-10 PROCEDURE — 99213 OFFICE O/P EST LOW 20 MIN: CPT | Performed by: INTERNAL MEDICINE

## 2024-07-10 PROCEDURE — 85025 COMPLETE CBC W/AUTO DIFF WBC: CPT

## 2024-07-10 ASSESSMENT — PATIENT HEALTH QUESTIONNAIRE - PHQ9
SUM OF ALL RESPONSES TO PHQ9 QUESTIONS 1 & 2: 0
SUM OF ALL RESPONSES TO PHQ QUESTIONS 1-9: 0
2. FEELING DOWN, DEPRESSED OR HOPELESS: NOT AT ALL
SUM OF ALL RESPONSES TO PHQ QUESTIONS 1-9: 0
SUM OF ALL RESPONSES TO PHQ QUESTIONS 1-9: 0
1. LITTLE INTEREST OR PLEASURE IN DOING THINGS: NOT AT ALL
SUM OF ALL RESPONSES TO PHQ QUESTIONS 1-9: 0

## 2024-07-10 NOTE — PATIENT INSTRUCTIONS
Patient Information from Today's Visit    The members of your [facility] are listed below:    Physician Provider: Anival Dykes Medical Oncologist  Advanced Practice Clinician: Vi Evans NP  Registered Nurse: Faustina BOO RN  Navigator: Dinora BOO RN  Medical Assistant: Piter ELDRIDGE MA  : Sowmya THOMAS   Supportive Care Services: Juanita RODRIGUES LMSW    Diagnosis: Lung      Follow Up Instructions: CT Chest, Abdomen, Pelvis, Neck in 6 months with plans to see Dr. Dykes after CT Scans  You can call to schedule this at [phone].       Treatment Summary has been discussed and given to patient:No      Current Labs:   Hospital Outpatient Visit on 07/10/2024   Component Date Value Ref Range Status    TSH w Free Thyroid if Abnormal 07/10/2024 1.83  0.27 - 4.20 UIU/ML Final    Sodium 07/10/2024 143  136 - 145 mmol/L Final    Potassium 07/10/2024 3.6  3.5 - 5.1 mmol/L Final    Chloride 07/10/2024 102  98 - 107 mmol/L Final    CO2 07/10/2024 29 (H)  20 - 28 mmol/L Final    Anion Gap 07/10/2024 12  9 - 18 mmol/L Final    Glucose 07/10/2024 96  70 - 99 mg/dL Final    Comment: <70 mg/dL Consistent with, but not fully diagnostic of hypoglycemia.  100 - 125 mg/dL Impaired fasting glucose/consistent with pre-diabetes mellitus.  > 126 mg/dl Fasting glucose consistent with overt diabetes mellitus      BUN 07/10/2024 13  8 - 23 MG/DL Final    Creatinine 07/10/2024 0.88  0.80 - 1.30 MG/DL Final    Est, Glom Filt Rate 07/10/2024 >90  >60 ml/min/1.73m2 Final    Comment:    Pediatric calculator link: https://www.kidney.org/professionals/kdoqi/gfr_calculatorped     These results are not intended for use in patients <18 years of age.     eGFR results are calculated without a race factor using  the 2021 CKD-EPI equation. Careful clinical correlation is recommended, particularly when comparing to results calculated using previous equations.  The CKD-EPI equation is less accurate in patients with extremes of muscle mass,

## 2024-08-24 NOTE — PROGRESS NOTES
[address], [address]  [place] [address]  PHONE: [phone]        NAME:  Juvencio Walsh  : 1950  MRN: [id number]     PCP:  John Lyn Jr., MD    SUBJECTIVE:   Juvencio Walsh is a 74 y.o. male seen for a follow up visit regarding the following:     Chief Complaint   Patient presents with    paroxysmal atrial fibrillation        HPI:     He presented recently establish new cardiac care.  He saw Dr. Irene at [facility] in 2022 with a history of incidentally discovered A-fib at a visit with his primary care physician.  He carries a history of hypertension, prior syncope, and lower extremity dependent edema.  Echocardiogram in 2022 at [facility] showed normal LV function at 60 to 65%, indeterminate left ventricular diastolic function, mildly dilated ascending aorta but no significant valvular abnormalities.  ECG at that time showed normal sinus rhythm.  Blood pressure was not controlled at that time and hydralazine was increased to 100 mg twice daily.  Eliquis was continued at that visit.    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   No adverse cardiac events around time of surgery. Doing well since recovery without interval angina, CHF, palpitations, edema, presyncope or syncope.  Vitals controlled and tolerating meds well. Staying active without any significant limitations.      Echocardiogram 2024:  Left Ventricle Normal left ventricular systolic function. EF by 2D Simpsons Biplane is 52%. Left ventricle size is normal. Increased wall thickness. Normal wall motion. Normal diastolic function.   Left Atrium Left atrium is dilated. LA Vol Index is  47 ml/m2.   Right Ventricle Right ventricle size is normal. Normal systolic function.   Right Atrium Right atrium size is normal.   Aortic Valve Valve structure is normal. No regurgitation. No stenosis.   Mitral Valve Valve structure is normal. No regurgitation.

## 2024-08-27 ENCOUNTER — OFFICE VISIT (OUTPATIENT)
Age: 74
End: 2024-08-27
Payer: MEDICARE

## 2024-08-27 VITALS
HEIGHT: 69 IN | SYSTOLIC BLOOD PRESSURE: 130 MMHG | BODY MASS INDEX: 26.79 KG/M2 | HEART RATE: 57 BPM | DIASTOLIC BLOOD PRESSURE: 60 MMHG | WEIGHT: 180.9 LBS

## 2024-08-27 DIAGNOSIS — R55 SYNCOPE, UNSPECIFIED SYNCOPE TYPE: ICD-10-CM

## 2024-08-27 DIAGNOSIS — I48.0 PAROXYSMAL ATRIAL FIBRILLATION (HCC): Primary | ICD-10-CM

## 2024-08-27 DIAGNOSIS — I10 PRIMARY HYPERTENSION: ICD-10-CM

## 2024-08-27 DIAGNOSIS — R60.0 LEG EDEMA: ICD-10-CM

## 2024-08-27 DIAGNOSIS — I77.810 ASCENDING AORTA DILATATION (HCC): ICD-10-CM

## 2024-08-27 PROCEDURE — 93000 ELECTROCARDIOGRAM COMPLETE: CPT | Performed by: INTERNAL MEDICINE

## 2024-08-27 PROCEDURE — 3078F DIAST BP <80 MM HG: CPT | Performed by: INTERNAL MEDICINE

## 2024-08-27 PROCEDURE — 3075F SYST BP GE 130 - 139MM HG: CPT | Performed by: INTERNAL MEDICINE

## 2024-08-27 PROCEDURE — 99214 OFFICE O/P EST MOD 30 MIN: CPT | Performed by: INTERNAL MEDICINE

## 2024-08-27 PROCEDURE — 1123F ACP DISCUSS/DSCN MKR DOCD: CPT | Performed by: INTERNAL MEDICINE

## 2025-01-06 ENCOUNTER — HOSPITAL ENCOUNTER (OUTPATIENT)
Dept: CT IMAGING | Age: 75
Discharge: HOME | End: 2025-01-08
Payer: MEDICARE

## 2025-01-06 DIAGNOSIS — Z85.46 H/O PROSTATE CANCER: ICD-10-CM

## 2025-01-06 DIAGNOSIS — E04.1 THYROID NODULE: ICD-10-CM

## 2025-01-06 DIAGNOSIS — D3A.090 CARCINOID TUMOR DETERMINED BY BIOPSY OF LUNG: ICD-10-CM

## 2025-01-06 LAB — CREAT BLD-MCNC: 0.78 MG/DL (ref 0.8–1.5)

## 2025-01-06 PROCEDURE — 82565 ASSAY OF CREATININE: CPT

## 2025-01-06 PROCEDURE — 6360000004 HC RX CONTRAST MEDICATION: Performed by: INTERNAL MEDICINE

## 2025-01-06 PROCEDURE — 71260 CT THORAX DX C+: CPT

## 2025-01-06 RX ORDER — IOPAMIDOL 755 MG/ML
100 INJECTION, SOLUTION INTRAVASCULAR
Status: COMPLETED | OUTPATIENT
Start: 2025-01-06 | End: 2025-01-06

## 2025-01-06 RX ORDER — DIATRIZOATE MEGLUMINE AND DIATRIZOATE SODIUM 660; 100 MG/ML; MG/ML
15 SOLUTION ORAL; RECTAL
Status: DISCONTINUED | OUTPATIENT
Start: 2025-01-06 | End: 2025-01-09 | Stop reason: HOSPADM

## 2025-01-06 RX ADMIN — DIATRIZOATE MEGLUMINE AND DIATRIZOATE SODIUM 15 ML: 660; 100 LIQUID ORAL; RECTAL at 10:22

## 2025-01-06 RX ADMIN — IOPAMIDOL 100 ML: 755 INJECTION, SOLUTION INTRAVENOUS at 10:22

## 2025-01-11 NOTE — PROGRESS NOTES
conversation to generate a clinical note, and support improvement of the AI technology. The patient (or guardian, if applicable) and other individuals in attendance at the appointment consented to the use of AI, including the recording.

## 2025-01-15 ENCOUNTER — HOSPITAL ENCOUNTER (OUTPATIENT)
Dept: LAB | Age: 75
Discharge: HOME | End: 2025-01-15
Payer: MEDICARE

## 2025-01-15 ENCOUNTER — OFFICE VISIT (OUTPATIENT)
Dept: ONCOLOGY | Age: 75
End: 2025-01-15
Payer: MEDICARE

## 2025-01-15 VITALS
HEIGHT: 69 IN | DIASTOLIC BLOOD PRESSURE: 69 MMHG | OXYGEN SATURATION: 99 % | WEIGHT: 183.7 LBS | SYSTOLIC BLOOD PRESSURE: 143 MMHG | BODY MASS INDEX: 27.21 KG/M2 | RESPIRATION RATE: 16 BRPM | TEMPERATURE: 97.5 F | HEART RATE: 53 BPM

## 2025-01-15 DIAGNOSIS — E04.1 THYROID NODULE: ICD-10-CM

## 2025-01-15 DIAGNOSIS — D3A.090 CARCINOID TUMOR DETERMINED BY BIOPSY OF LUNG: Primary | ICD-10-CM

## 2025-01-15 DIAGNOSIS — Z85.46 H/O PROSTATE CANCER: ICD-10-CM

## 2025-01-15 DIAGNOSIS — D3A.090 CARCINOID TUMOR DETERMINED BY BIOPSY OF LUNG: ICD-10-CM

## 2025-01-15 LAB
ALBUMIN SERPL-MCNC: 4.5 G/DL (ref 3.2–4.6)
ALBUMIN/GLOB SERPL: 1.4 (ref 1–1.9)
ALP SERPL-CCNC: 62 U/L (ref 40–129)
ALT SERPL-CCNC: 12 U/L (ref 8–55)
ANION GAP SERPL CALC-SCNC: 10 MMOL/L (ref 7–16)
AST SERPL-CCNC: 20 U/L (ref 15–37)
BASOPHILS # BLD: 0.04 K/UL (ref 0–0.2)
BASOPHILS NFR BLD: 0.6 % (ref 0–2)
BILIRUB SERPL-MCNC: 0.5 MG/DL (ref 0–1.2)
BUN SERPL-MCNC: 14 MG/DL (ref 8–23)
CALCIUM SERPL-MCNC: 9.9 MG/DL (ref 8.8–10.2)
CHLORIDE SERPL-SCNC: 103 MMOL/L (ref 98–107)
CO2 SERPL-SCNC: 29 MMOL/L (ref 20–29)
CREAT SERPL-MCNC: 0.94 MG/DL (ref 0.8–1.3)
DIFFERENTIAL METHOD BLD: ABNORMAL
EOSINOPHIL # BLD: 0.17 K/UL (ref 0–0.8)
EOSINOPHIL NFR BLD: 2.6 % (ref 0.5–7.8)
ERYTHROCYTE [DISTWIDTH] IN BLOOD BY AUTOMATED COUNT: 12.7 % (ref 11.9–14.6)
GLOBULIN SER CALC-MCNC: 3.3 G/DL (ref 2.3–3.5)
GLUCOSE SERPL-MCNC: 92 MG/DL (ref 70–99)
HCT VFR BLD AUTO: 47.1 % (ref 41.1–50.3)
HGB BLD-MCNC: 15 G/DL (ref 13.6–17.2)
IMM GRANULOCYTES # BLD AUTO: 0.01 K/UL (ref 0–0.5)
IMM GRANULOCYTES NFR BLD AUTO: 0.2 % (ref 0–5)
LYMPHOCYTES # BLD: 1.51 K/UL (ref 0.5–4.6)
LYMPHOCYTES NFR BLD: 22.9 % (ref 13–44)
MCH RBC QN AUTO: 27.6 PG (ref 26.1–32.9)
MCHC RBC AUTO-ENTMCNC: 31.8 G/DL (ref 31.4–35)
MCV RBC AUTO: 86.6 FL (ref 82–102)
MONOCYTES # BLD: 0.35 K/UL (ref 0.1–1.3)
MONOCYTES NFR BLD: 5.3 % (ref 4–12)
NEUTS SEG # BLD: 4.52 K/UL (ref 1.7–8.2)
NEUTS SEG NFR BLD: 68.4 % (ref 43–78)
NRBC # BLD: 0 K/UL (ref 0–0.2)
PLATELET # BLD AUTO: 225 K/UL (ref 150–450)
PMV BLD AUTO: 9.3 FL (ref 9.4–12.3)
POTASSIUM SERPL-SCNC: 3.6 MMOL/L (ref 3.5–5.1)
PROT SERPL-MCNC: 7.7 G/DL (ref 6.3–8.2)
RBC # BLD AUTO: 5.44 M/UL (ref 4.23–5.6)
SODIUM SERPL-SCNC: 142 MMOL/L (ref 136–145)
TSH W FREE THYROID IF ABNORMAL: 1.41 UIU/ML (ref 0.27–4.2)
WBC # BLD AUTO: 6.6 K/UL (ref 4.3–11.1)

## 2025-01-15 PROCEDURE — 1123F ACP DISCUSS/DSCN MKR DOCD: CPT | Performed by: INTERNAL MEDICINE

## 2025-01-15 PROCEDURE — 85025 COMPLETE CBC W/AUTO DIFF WBC: CPT

## 2025-01-15 PROCEDURE — 36415 COLL VENOUS BLD VENIPUNCTURE: CPT

## 2025-01-15 PROCEDURE — 99213 OFFICE O/P EST LOW 20 MIN: CPT | Performed by: INTERNAL MEDICINE

## 2025-01-15 PROCEDURE — 1126F AMNT PAIN NOTED NONE PRSNT: CPT | Performed by: INTERNAL MEDICINE

## 2025-01-15 PROCEDURE — 1160F RVW MEDS BY RX/DR IN RCRD: CPT | Performed by: INTERNAL MEDICINE

## 2025-01-15 PROCEDURE — 3078F DIAST BP <80 MM HG: CPT | Performed by: INTERNAL MEDICINE

## 2025-01-15 PROCEDURE — 3077F SYST BP >= 140 MM HG: CPT | Performed by: INTERNAL MEDICINE

## 2025-01-15 PROCEDURE — 84443 ASSAY THYROID STIM HORMONE: CPT

## 2025-01-15 PROCEDURE — 1159F MED LIST DOCD IN RCRD: CPT | Performed by: INTERNAL MEDICINE

## 2025-01-15 PROCEDURE — 80053 COMPREHEN METABOLIC PANEL: CPT

## 2025-01-15 ASSESSMENT — PATIENT HEALTH QUESTIONNAIRE - PHQ9
2. FEELING DOWN, DEPRESSED OR HOPELESS: NOT AT ALL
SUM OF ALL RESPONSES TO PHQ9 QUESTIONS 1 & 2: 0
1. LITTLE INTEREST OR PLEASURE IN DOING THINGS: NOT AT ALL
SUM OF ALL RESPONSES TO PHQ QUESTIONS 1-9: 0

## 2025-01-15 NOTE — PATIENT INSTRUCTIONS
Patient Information from Today's Visit    The members of your [facility] are listed below:    Physician Provider: Anival Dykes, Medical Oncologist  Advanced Practice Clinician: Vi Evans NP  Registered Nurse: Faustina BOO RN  Navigator: Dinora BOO RN  Medical Assistant: Piter ELDRIDGE MA  : Ericka SWAIN   Supportive Care Services: Juanita RODRIGUES LMSW    Diagnosis: Lung      Follow Up Instructions:   - Labs reviewed  - CT scan reviewed, repeat in 6 months    Follow up in 6 months with labs prior    Just want to remind you that you will need to have your lab work drawn at one of our outreach lab locations 24-72 hours prior to your follow up appointment scheduled at [facility].      You may visit any of the 4 outreach lab locations, during hours of operation, at a time that is convenient for you as long as it is within the '24-72 hour prior' window.      1)   [facility]  [address]   Phone: [phone]  Mon - Fri 730am - 430pm     2)   [facility]  [address], [address]  Phone: [phone]  Mon - Fri 730am - 430pm     3)   [facility]  [address]  Phone: [phone]  Mon - Fri 700am - 430pm     4)   [facility]   [address], [address]  Phone: [phone]  Mon - Fri 730am - 430pm     Thanks you!      Treatment Summary has been discussed and given to patient:No      Current Labs:   Hospital Outpatient Visit on 01/15/2025   Component Date Value Ref Range Status    WBC 01/15/2025 6.6  4.3 - 11.1 K/uL Final    RBC 01/15/2025 5.44  4.23 - 5.6 M/uL Final    Hemoglobin 01/15/2025 15.0  13.6 - 17.2 g/dL Final    Hematocrit 01/15/2025 47.1  41.1 - 50.3 % Final    MCV 01/15/2025 86.6  82.0 - 102.0 FL Final    MCH 01/15/2025 27.6  26.1 - 32.9 PG Final    MCHC 01/15/2025 31.8  31.4 - 35.0 g/dL Final    RDW 01/15/2025 12.7  11.9 - 14.6 % Final    Platelets 01/15/2025 225

## 2025-03-09 NOTE — PROGRESS NOTES
HDL 66 10/24/2019     No components found for: \"LDLCHOLESTEROL\", \"LDLCALC\"    Lab Results   Component Value Date    VLDL 13 05/29/2020    VLDL 16 10/24/2019     No results found for: \"CHOLHDLRATIO\"     Lab Results   Component Value Date/Time     01/15/2025 09:50 AM    K 3.6 01/15/2025 09:50 AM     01/15/2025 09:50 AM    CO2 29 01/15/2025 09:50 AM    BUN 14 01/15/2025 09:50 AM    CREATININE 0.94 01/15/2025 09:50 AM    GLUCOSE 92 01/15/2025 09:50 AM    CALCIUM 9.9 01/15/2025 09:50 AM         Lab Results   Component Value Date    WBC 6.6 01/15/2025    HGB 15.0 01/15/2025    HCT 47.1 01/15/2025    MCV 86.6 01/15/2025     01/15/2025        Lab Results   Component Value Date    TSH 1.650 10/24/2019      Lab Results   Component Value Date/Time    ALKPHOS 62 01/15/2025 09:50 AM    ALKPHOS 61 05/29/2020 08:26 AM    ALT 12 01/15/2025 09:50 AM    AST 20 01/15/2025 09:50 AM    BILITOT 0.5 01/15/2025 09:50 AM        No results found for: \"LABA1C\"    No results found for any visits on 03/11/25.       Chanda labs 7/19/2023 reviewed by me:  Triglycerides 78  Cholesterol 207  HDL 64    AST 24  ALT 30      ASSESSMENT and PLAN    Juvencio was seen today for follow-up and atrial fibrillation.    Diagnoses and all orders for this visit:    Paroxysmal atrial fibrillation (HCC)-clinically asymptomatic with no interval palpitations or tachycardia.  Continue carvedilol and Eliquis without change.  Call with any recurrent tachypalpitations or acute symptoms of angina/anginal equivalent    Primary hypertension-excellent, continue carvedilol, amlodipine and HCT with healthy diet. Continue exercising    Syncope-isolated episode several years ago in the setting of new diagnosis of A-fib.  None since.  Staying hydrated and exercising without limitation    Leg edema-in the past, none recent.  None today.  Echo looks good    Ascending aorta dilatation (HCC)-asymptomatic with mild dilatation of the ascending aorta at 4.1 cm.

## 2025-03-11 ENCOUNTER — RESULTS FOLLOW-UP (OUTPATIENT)
Age: 75
End: 2025-03-11

## 2025-03-11 ENCOUNTER — OFFICE VISIT (OUTPATIENT)
Age: 75
End: 2025-03-11
Payer: MEDICARE

## 2025-03-11 VITALS
BODY MASS INDEX: 27.83 KG/M2 | SYSTOLIC BLOOD PRESSURE: 138 MMHG | WEIGHT: 183.6 LBS | HEIGHT: 68 IN | DIASTOLIC BLOOD PRESSURE: 72 MMHG | HEART RATE: 60 BPM

## 2025-03-11 DIAGNOSIS — R60.0 LEG EDEMA: ICD-10-CM

## 2025-03-11 DIAGNOSIS — R55 SYNCOPE, UNSPECIFIED SYNCOPE TYPE: ICD-10-CM

## 2025-03-11 DIAGNOSIS — I77.810 ASCENDING AORTA DILATATION: ICD-10-CM

## 2025-03-11 DIAGNOSIS — I48.0 PAROXYSMAL ATRIAL FIBRILLATION (HCC): Primary | ICD-10-CM

## 2025-03-11 DIAGNOSIS — I48.0 PAROXYSMAL ATRIAL FIBRILLATION (HCC): ICD-10-CM

## 2025-03-11 DIAGNOSIS — I10 PRIMARY HYPERTENSION: ICD-10-CM

## 2025-03-11 LAB
CHOLEST SERPL-MCNC: 215 MG/DL (ref 0–200)
HDLC SERPL-MCNC: 64 MG/DL (ref 40–60)
HDLC SERPL: 3.4 (ref 0–5)
LDLC SERPL CALC-MCNC: 138 MG/DL (ref 0–100)
TRIGL SERPL-MCNC: 65 MG/DL (ref 0–150)
VLDLC SERPL CALC-MCNC: 13 MG/DL (ref 6–23)

## 2025-03-11 PROCEDURE — 1126F AMNT PAIN NOTED NONE PRSNT: CPT | Performed by: INTERNAL MEDICINE

## 2025-03-11 PROCEDURE — 3078F DIAST BP <80 MM HG: CPT | Performed by: INTERNAL MEDICINE

## 2025-03-11 PROCEDURE — 1159F MED LIST DOCD IN RCRD: CPT | Performed by: INTERNAL MEDICINE

## 2025-03-11 PROCEDURE — 3075F SYST BP GE 130 - 139MM HG: CPT | Performed by: INTERNAL MEDICINE

## 2025-03-11 PROCEDURE — 99214 OFFICE O/P EST MOD 30 MIN: CPT | Performed by: INTERNAL MEDICINE

## 2025-03-11 PROCEDURE — 1123F ACP DISCUSS/DSCN MKR DOCD: CPT | Performed by: INTERNAL MEDICINE

## 2025-03-11 PROCEDURE — 1160F RVW MEDS BY RX/DR IN RCRD: CPT | Performed by: INTERNAL MEDICINE

## 2025-03-11 NOTE — TELEPHONE ENCOUNTER
----- Message from Dr. Nolan Peters MD sent at 3/11/2025  2:43 PM EDT -----  LDL still elevated, in fact a bit higher at 138.  This will increase his risk for cardiovascular complications in the future.  If he wants to try even a low-dose statin, I think 5 mg of rosuvastatin will improve lipids quite a bit with minimal side effects including a 2 to 3% risk for muscle aches.  If he wishes, start 5 mg rosuvastatin and recheck lipid and liver in 8 to 12 weeks  ----- Message -----  From: Isaac Young Incoming Kris W/Denise Micro  Sent: 3/11/2025  12:46 PM EDT  To: Nolan Peters MD

## 2025-03-13 RX ORDER — ROSUVASTATIN CALCIUM 5 MG/1
5 TABLET, COATED ORAL NIGHTLY
Qty: 90 TABLET | Refills: 3 | Status: SHIPPED | OUTPATIENT
Start: 2025-03-13

## 2025-03-13 RX ORDER — ROSUVASTATIN CALCIUM 5 MG/1
5 TABLET, COATED ORAL NIGHTLY
Qty: 30 TABLET | Refills: 3 | Status: CANCELLED | OUTPATIENT
Start: 2025-03-13

## 2025-03-13 NOTE — TELEPHONE ENCOUNTER
Spoke to patient and reviewed results. All questions and concerns addressed.   Pt is willing to try low dose statin. RX sent to provider for approval.

## 2025-03-13 NOTE — TELEPHONE ENCOUNTER
Patient stated this medication needs to be sent to [facility]. He would like a call when it has been sent.

## 2025-03-13 NOTE — TELEPHONE ENCOUNTER
Requested Prescriptions     Pending Prescriptions Disp Refills    rosuvastatin (CRESTOR) 5 MG tablet 30 tablet 3     Sig: Take 1 tablet by mouth nightly       RX verified result note 03/11/25

## 2025-06-24 ENCOUNTER — TELEPHONE (OUTPATIENT)
Dept: ONCOLOGY | Age: 75
End: 2025-06-24

## 2025-06-24 DIAGNOSIS — Z85.46 H/O PROSTATE CANCER: ICD-10-CM

## 2025-06-24 DIAGNOSIS — E04.1 THYROID NODULE: ICD-10-CM

## 2025-06-24 DIAGNOSIS — D3A.090 CARCINOID TUMOR DETERMINED BY BIOPSY OF LUNG (HCC): Primary | ICD-10-CM

## 2025-06-24 NOTE — TELEPHONE ENCOUNTER
Physician provider: Dr. Dykes  Reason for today's call (Please detail here patients chief complaint): Referral if [facility] and [facility] do not reach agreement    Last office visit:na  Patient Callback Number: [phone]  Was callback number verified?: Yes  Preferred pharmacy (If refill request): na  Veriified that patient confirmed no refills left at pharmacy? :No  Has the patient called the office for this concern within the last 48 hours?:No    Red Word Mentioned  Warm Transfer Phone Call to (Name): na    Patient notified that their information will be routed to the appropriate team for review. Patient is advised that they will receive a phone call from the appropriate department. If awaiting a call from the triage department and symptoms worsen before receiving a call back, the patient has been advised to proceed to the nearest ED.

## 2025-06-25 ENCOUNTER — OFFICE VISIT (OUTPATIENT)
Dept: ORTHOPEDIC SURGERY | Age: 75
End: 2025-06-25
Payer: MEDICARE

## 2025-06-25 DIAGNOSIS — T84.093D FAILED TOTAL LEFT KNEE REPLACEMENT, SUBSEQUENT ENCOUNTER: Primary | ICD-10-CM

## 2025-06-25 PROCEDURE — 1123F ACP DISCUSS/DSCN MKR DOCD: CPT | Performed by: PHYSICIAN ASSISTANT

## 2025-06-25 PROCEDURE — 99214 OFFICE O/P EST MOD 30 MIN: CPT | Performed by: PHYSICIAN ASSISTANT

## 2025-06-25 NOTE — PROGRESS NOTES
Name: Juvencio Walsh  YOB: 1950  Gender: male  MRN: [id number]    CC:   Chief Complaint   Patient presents with    Knee Pain     Lt TKA recheck  Per ov 5/30/24 need poss revision due to lose tibia         DIAGNOSIS:   Encounter Diagnosis   Name Primary?    Failed total left knee replacement, subsequent encounter Yes        HPI:   The patient returns today for follow-up on left knee pain.  He is 16 years status post left TKA which was performed by Dr. Giovanni Melara in [facility].  He has known left tibial baseplate loosening which was demonstrated on bone scan from 2021.  He had left knee aspirate in 2021 which was negative for any evidence of infection.  He has noticed that he has more left knee soreness and swelling with increased activity.  He has to wear knee brace from time to time as he feels that his left knee sometimes feels unstable and wants to give way.  He reports that his left knee pain has improved and is currently minimal in severity.  He denies fever, chills, overlying warmth, or redness about the left knee.  No other new complaints.    Current Outpatient Medications:     rosuvastatin (CRESTOR) 5 MG tablet, Take 1 tablet by mouth nightly, Disp: 90 tablet, Rfl: 3    Multiple Vitamin (MULTIVITAMIN ADULT PO), Take by mouth, Disp: , Rfl:     hydroCHLOROthiazide (HYDRODIURIL) 25 MG tablet, Take 1 tablet by mouth every morning, Disp: 90 tablet, Rfl: 3    carvedilol (COREG) 25 MG tablet, Take 1 tablet by mouth 2 times daily, Disp: , Rfl:     apixaban (ELIQUIS) 5 MG TABS tablet, Take 1 tablet by mouth 2 times daily, Disp: , Rfl:     amLODIPine (NORVASC) 10 MG tablet, TAKE 1 TABLET BY MOUTH DAILY, Disp: , Rfl:     zoster recombinant adjuvanted vaccine (SHINGRIX) 50 MCG/0.5ML SUSR injection, 0.5mL by IntraMUSCular route once now and then repeat in 2-6 months (Patient not taking: Reported on 1/26/2024), Disp: , Rfl:   Allergies   Allergen Reactions    Ace Inhibitors Angioedema     Actually

## 2025-06-30 ENCOUNTER — HOSPITAL ENCOUNTER (OUTPATIENT)
Dept: CT IMAGING | Age: 75
Discharge: HOME | End: 2025-07-03
Attending: INTERNAL MEDICINE
Payer: MEDICARE

## 2025-06-30 DIAGNOSIS — D3A.090 CARCINOID TUMOR DETERMINED BY BIOPSY OF LUNG (HCC): ICD-10-CM

## 2025-06-30 LAB — CREAT BLD-MCNC: 0.92 MG/DL (ref 0.8–1.5)

## 2025-06-30 PROCEDURE — 74177 CT ABD & PELVIS W/CONTRAST: CPT

## 2025-06-30 PROCEDURE — 82565 ASSAY OF CREATININE: CPT

## 2025-06-30 PROCEDURE — 6360000004 HC RX CONTRAST MEDICATION: Performed by: INTERNAL MEDICINE

## 2025-06-30 RX ORDER — IOPAMIDOL 755 MG/ML
100 INJECTION, SOLUTION INTRAVASCULAR
Status: COMPLETED | OUTPATIENT
Start: 2025-06-30 | End: 2025-06-30

## 2025-06-30 RX ADMIN — IOPAMIDOL 100 ML: 755 INJECTION, SOLUTION INTRAVENOUS at 10:52

## 2025-07-15 DIAGNOSIS — D3A.090 CARCINOID TUMOR DETERMINED BY BIOPSY OF LUNG (HCC): ICD-10-CM

## 2025-07-15 LAB
ALBUMIN SERPL-MCNC: 3.9 G/DL (ref 3.2–4.6)
ALBUMIN/GLOB SERPL: 1.3 (ref 1–1.9)
ALP SERPL-CCNC: 55 U/L (ref 40–129)
ALT SERPL-CCNC: 28 U/L (ref 8–55)
ANION GAP SERPL CALC-SCNC: 11 MMOL/L (ref 7–16)
AST SERPL-CCNC: 28 U/L (ref 15–37)
BASOPHILS # BLD: 0.03 K/UL (ref 0–0.2)
BASOPHILS NFR BLD: 0.5 % (ref 0–2)
BILIRUB SERPL-MCNC: 0.4 MG/DL (ref 0–1.2)
BUN SERPL-MCNC: 9 MG/DL (ref 8–23)
CALCIUM SERPL-MCNC: 9.2 MG/DL (ref 8.8–10.2)
CHLORIDE SERPL-SCNC: 101 MMOL/L (ref 98–107)
CO2 SERPL-SCNC: 28 MMOL/L (ref 20–29)
CREAT SERPL-MCNC: 0.91 MG/DL (ref 0.8–1.3)
DIFFERENTIAL METHOD BLD: ABNORMAL
EOSINOPHIL # BLD: 0.21 K/UL (ref 0–0.8)
EOSINOPHIL NFR BLD: 3.5 % (ref 0.5–7.8)
ERYTHROCYTE [DISTWIDTH] IN BLOOD BY AUTOMATED COUNT: 12.9 % (ref 11.9–14.6)
GLOBULIN SER CALC-MCNC: 2.9 G/DL (ref 2.3–3.5)
GLUCOSE SERPL-MCNC: 92 MG/DL (ref 70–99)
HCT VFR BLD AUTO: 42.5 % (ref 41.1–50.3)
HGB BLD-MCNC: 13.4 G/DL (ref 13.6–17.2)
IMM GRANULOCYTES # BLD AUTO: 0.02 K/UL (ref 0–0.5)
IMM GRANULOCYTES NFR BLD AUTO: 0.3 % (ref 0–5)
LYMPHOCYTES # BLD: 1.26 K/UL (ref 0.5–4.6)
LYMPHOCYTES NFR BLD: 21.2 % (ref 13–44)
MCH RBC QN AUTO: 27.1 PG (ref 26.1–32.9)
MCHC RBC AUTO-ENTMCNC: 31.5 G/DL (ref 31.4–35)
MCV RBC AUTO: 85.9 FL (ref 82–102)
MONOCYTES # BLD: 0.45 K/UL (ref 0.1–1.3)
MONOCYTES NFR BLD: 7.6 % (ref 4–12)
NEUTS SEG # BLD: 3.98 K/UL (ref 1.7–8.2)
NEUTS SEG NFR BLD: 66.9 % (ref 43–78)
NRBC # BLD: 0 K/UL (ref 0–0.2)
PLATELET # BLD AUTO: 189 K/UL (ref 150–450)
PMV BLD AUTO: 10.6 FL (ref 9.4–12.3)
POTASSIUM SERPL-SCNC: 3.8 MMOL/L (ref 3.5–5.1)
PROT SERPL-MCNC: 6.7 G/DL (ref 6.3–8.2)
RBC # BLD AUTO: 4.95 M/UL (ref 4.23–5.6)
SODIUM SERPL-SCNC: 140 MMOL/L (ref 136–145)
WBC # BLD AUTO: 6 K/UL (ref 4.3–11.1)

## 2025-07-21 DIAGNOSIS — Z85.46 H/O PROSTATE CANCER: ICD-10-CM

## 2025-07-21 DIAGNOSIS — E04.1 THYROID NODULE: ICD-10-CM

## 2025-07-21 DIAGNOSIS — D3A.090 CARCINOID TUMOR DETERMINED BY BIOPSY OF LUNG (HCC): Primary | ICD-10-CM

## 2025-07-21 DIAGNOSIS — D64.9 ANEMIA, UNSPECIFIED TYPE: ICD-10-CM

## 2025-07-23 ENCOUNTER — HOSPITAL ENCOUNTER (OUTPATIENT)
Dept: LAB | Age: 75
Discharge: HOME | End: 2025-07-23
Payer: MEDICARE

## 2025-07-23 ENCOUNTER — OFFICE VISIT (OUTPATIENT)
Dept: ONCOLOGY | Age: 75
End: 2025-07-23
Payer: MEDICARE

## 2025-07-23 VITALS
TEMPERATURE: 97.4 F | HEART RATE: 55 BPM | SYSTOLIC BLOOD PRESSURE: 131 MMHG | HEIGHT: 69 IN | DIASTOLIC BLOOD PRESSURE: 68 MMHG | RESPIRATION RATE: 16 BRPM | WEIGHT: 185.4 LBS | OXYGEN SATURATION: 98 % | BODY MASS INDEX: 27.46 KG/M2

## 2025-07-23 DIAGNOSIS — Z85.46 H/O PROSTATE CANCER: ICD-10-CM

## 2025-07-23 DIAGNOSIS — E04.1 THYROID NODULE: ICD-10-CM

## 2025-07-23 DIAGNOSIS — D3A.090 CARCINOID TUMOR DETERMINED BY BIOPSY OF LUNG (HCC): ICD-10-CM

## 2025-07-23 DIAGNOSIS — D3A.090 CARCINOID TUMOR DETERMINED BY BIOPSY OF LUNG (HCC): Primary | ICD-10-CM

## 2025-07-23 DIAGNOSIS — D64.9 ANEMIA, UNSPECIFIED TYPE: ICD-10-CM

## 2025-07-23 LAB
FERRITIN SERPL-MCNC: 252 NG/ML (ref 8–388)
FOLATE SERPL-MCNC: 36.7 NG/ML (ref 3.1–17.5)
IRON SATN MFR SERPL: 26 % (ref 20–50)
IRON SERPL-MCNC: 74 UG/DL (ref 35–100)
TIBC SERPL-MCNC: 288 UG/DL (ref 240–450)
UIBC SERPL-MCNC: 214 UG/DL (ref 112–347)
VIT B12 SERPL-MCNC: 855 PG/ML (ref 193–986)

## 2025-07-23 PROCEDURE — 36415 COLL VENOUS BLD VENIPUNCTURE: CPT

## 2025-07-23 PROCEDURE — 3075F SYST BP GE 130 - 139MM HG: CPT | Performed by: INTERNAL MEDICINE

## 2025-07-23 PROCEDURE — 82728 ASSAY OF FERRITIN: CPT

## 2025-07-23 PROCEDURE — 83550 IRON BINDING TEST: CPT

## 2025-07-23 PROCEDURE — 82746 ASSAY OF FOLIC ACID SERUM: CPT

## 2025-07-23 PROCEDURE — 3078F DIAST BP <80 MM HG: CPT | Performed by: INTERNAL MEDICINE

## 2025-07-23 PROCEDURE — 99213 OFFICE O/P EST LOW 20 MIN: CPT | Performed by: INTERNAL MEDICINE

## 2025-07-23 PROCEDURE — 1159F MED LIST DOCD IN RCRD: CPT | Performed by: INTERNAL MEDICINE

## 2025-07-23 PROCEDURE — 83540 ASSAY OF IRON: CPT

## 2025-07-23 PROCEDURE — 1126F AMNT PAIN NOTED NONE PRSNT: CPT | Performed by: INTERNAL MEDICINE

## 2025-07-23 PROCEDURE — 1160F RVW MEDS BY RX/DR IN RCRD: CPT | Performed by: INTERNAL MEDICINE

## 2025-07-23 PROCEDURE — 82607 VITAMIN B-12: CPT

## 2025-07-23 PROCEDURE — 1123F ACP DISCUSS/DSCN MKR DOCD: CPT | Performed by: INTERNAL MEDICINE

## 2025-07-23 ASSESSMENT — PATIENT HEALTH QUESTIONNAIRE - PHQ9
SUM OF ALL RESPONSES TO PHQ QUESTIONS 1-9: 0
1. LITTLE INTEREST OR PLEASURE IN DOING THINGS: NOT AT ALL
SUM OF ALL RESPONSES TO PHQ QUESTIONS 1-9: 0
2. FEELING DOWN, DEPRESSED OR HOPELESS: NOT AT ALL

## 2025-07-23 NOTE — PATIENT INSTRUCTIONS
Patient Information from Today's Visit    The members of your [facility]cology Medical [facility] are listed below:    Physician Provider: Dr. Anival Dykes  Advanced Practice Clinician: Vi Evans  Registered Nurse: Faustina BOO   Nurse Navigator: Dinora BOO RN  Medical Assistant: Piter ELDRIDGE  : Ericka SWAIN  Supportive Care Services: ANASTASIA Blandon    Diagnosis (Information Sheet Provided on Day of Diagnosis): Lung    Follow Up Instructions:   - Labs reviewed; nutritional labs being checked today. We will call you if there is anything concerning regarding these results that requires intervention  - Discussed CT scan results, will repeat in 1 year  Please call radiology scheduling to schedule scan.   Their number is: [phone]  Please make sure scans are scheduled at least 72 hours prior to the follow up visit for imaging review with us.  ** if imaging is not completed prior to your follow up appointment with us, this may result in your follow up appointment being rescheduled **    Follow up in 1 year with labs prior    Just want to remind you that you will need to have your lab work drawn at one of our outreach lab locations 24-72 hours prior to your follow up appointment scheduled at [facility].      You may visit any of the 4 outreach lab locations, during hours of operation, at a time that is convenient for you as long as it is within the '24-72 hour prior' window.      1)   [facility]  [address]  Phone: [phone]  Mon - Fri 7:30am - 4:30pm     2)   [facility]  [address] , [address]  Phone: [phone]  Mon - Fri 7:30am - 4:30pm     3)   [facility]  [address]  Phone: [phone]  Mon - Fri 7:30am - 4:30pm     4)   [facility]   [address], [address]  Phone: [phone]  Mon - Fri 7:30am - 4:30pm    5)   [facility] [place]   [address]

## (undated) DEVICE — SPONGE LAP 18X18IN STRL -- 5/PK

## (undated) DEVICE — LARGE TEAR CROSS CUT RASP (14.0 X 7.0MM)

## (undated) DEVICE — ZIP 16 SURGICAL SKIN CLOSURE DEVICE: Brand: ZIP 16 SURGICAL SKIN CLOSURE DEVICE

## (undated) DEVICE — PRECISION THIN (9.0 X 0.38 X 31.0MM)

## (undated) DEVICE — 3M™ IOBAN™ 2 ANTIMICROBIAL INCISE DRAPE 6650EZ: Brand: IOBAN™ 2

## (undated) DEVICE — TIBIAL CORNER DRILL: Brand: INFINITY

## (undated) DEVICE — SCREW, BONE REMOVER: Brand: INBONE

## (undated) DEVICE — DRAPE XR C ARM 41X74IN LF --

## (undated) DEVICE — ZIMMER® STERILE DISPOSABLE TOURNIQUET CUFF WITH PLC, DUAL PORT, SINGLE BLADDER, 30 IN. (76 CM)

## (undated) DEVICE — PREP SKN CHLRAPRP APL 26ML STR --

## (undated) DEVICE — TALAR REAMER: Brand: INFINITY

## (undated) DEVICE — DRAPE TWL SURG 16X26IN BLU ORB04] ALLCARE INC]

## (undated) DEVICE — MANIFOLD REPROC 4 PRT NEP 1

## (undated) DEVICE — INTENDED FOR TISSUE SEPARATION, AND OTHER PROCEDURES THAT REQUIRE A SHARP SURGICAL BLADE TO PUNCTURE OR CUT.: Brand: BARD-PARKER ® STAINLESS STEEL BLADES

## (undated) DEVICE — BANDAGE,GAUZE,BULKEE II,4.5"X4.1YD,STRL: Brand: MEDLINE

## (undated) DEVICE — SUTURE MCRYL SZ 3-0 L27IN ABSRB UD L19MM PS-2 3/8 CIR PRIM Y427H

## (undated) DEVICE — FOOT DR TOLLISON & WOMACK: Brand: MEDLINE INDUSTRIES, INC.

## (undated) DEVICE — Device: Brand: PROPHECY INFINITY

## (undated) DEVICE — COVER,TABLE,HEAVY DUTY,79"X110",STRL: Brand: MEDLINE

## (undated) DEVICE — PAD,ABDOMINAL,5"X9",ST,LF,25/BX: Brand: MEDLINE INDUSTRIES, INC.

## (undated) DEVICE — STERILE HOOK LOCK LATEX FREE ELASTIC BANDAGE 4INX5YD: Brand: HOOK LOCK™

## (undated) DEVICE — WIDE SAW BLADE

## (undated) DEVICE — DRAPE,U/SHT,SPLIT,FILM,60X84,STERILE: Brand: MEDLINE

## (undated) DEVICE — Device

## (undated) DEVICE — DRAPE SHT 3 QTR PROXIMA 53X77 --

## (undated) DEVICE — STERILE HOOK LOCK LATEX FREE ELASTIC BANDAGE 6INX5YD: Brand: HOOK LOCK™

## (undated) DEVICE — DERMABOND SKIN ADH 0.7ML -- DERMABOND ADVANCED 12/BX

## (undated) DEVICE — BUTTON SWITCH PENCIL BLADE ELECTRODE, HOLSTER: Brand: EDGE

## (undated) DEVICE — AMD ANTIMICROBIAL GAUZE SPONGES,12 PLY USP TYPE VII, 0.2% POLYHEXAMETHYLENE BIGUANIDE HCI (PHMB): Brand: CURITY

## (undated) DEVICE — REM POLYHESIVE ADULT PATIENT RETURN ELECTRODE: Brand: VALLEYLAB

## (undated) DEVICE — PADDING CAST W4INXL4YD ST COT COHESIVE HND TEARABLE SPEC

## (undated) DEVICE — DRSG GZ OIL EMUL CURAD 3X8 --